# Patient Record
Sex: MALE | Race: WHITE | NOT HISPANIC OR LATINO | Employment: OTHER | URBAN - METROPOLITAN AREA
[De-identification: names, ages, dates, MRNs, and addresses within clinical notes are randomized per-mention and may not be internally consistent; named-entity substitution may affect disease eponyms.]

---

## 2020-12-28 ENCOUNTER — TRANSFERRED RECORDS (OUTPATIENT)
Dept: HEALTH INFORMATION MANAGEMENT | Facility: CLINIC | Age: 70
End: 2020-12-28
Payer: MEDICARE

## 2021-05-21 LAB
ALT SERPL-CCNC: 31 IU/L (ref 12–65)
AST SERPL-CCNC: 28 IU/L (ref 15–37)

## 2021-07-07 LAB
CHOLESTEROL (EXTERNAL): 101 MG/DL (ref 0–199)
HDLC SERPL-MCNC: 46 MG/DL (ref 40–59)
HEP C HIM: NORMAL
LDL CHOLESTEROL (EXTERNAL): 36 MG/DL
TRIGLYCERIDES (EXTERNAL): 97 MG/DL

## 2021-08-02 ENCOUNTER — TRANSFERRED RECORDS (OUTPATIENT)
Dept: HEALTH INFORMATION MANAGEMENT | Facility: CLINIC | Age: 71
End: 2021-08-02

## 2021-08-24 ENCOUNTER — TRANSFERRED RECORDS (OUTPATIENT)
Dept: HEALTH INFORMATION MANAGEMENT | Facility: CLINIC | Age: 71
End: 2021-08-24

## 2021-08-27 ENCOUNTER — TRANSFERRED RECORDS (OUTPATIENT)
Dept: HEALTH INFORMATION MANAGEMENT | Facility: CLINIC | Age: 71
End: 2021-08-27

## 2021-09-20 ENCOUNTER — TRANSFERRED RECORDS (OUTPATIENT)
Dept: HEALTH INFORMATION MANAGEMENT | Facility: CLINIC | Age: 71
End: 2021-09-20

## 2021-10-27 NOTE — PROGRESS NOTES
m  RADIATION ONCOLOGY CONSULTATION  [facility] PHYSICIANS    DATE:  October 27, 2021    PATIENT NAME: Efraín Crouch  MEDICAL RECORD NUMBER: [id number]    REFERRING PHYSICIAN:  Dr. Barry Perales, Urology ([facility])    REASON FOR CONSULTATION: Consideration of  definitive radiotherapy for management of adenocarcinoma of the prostate.    Staging:  Clinical  T1c N0 M0  Wren[place]'s score:   Wrenshall's score: 4+3=7  PSA:    8/2/21  11.870  (Diagnostic)  7/7/21  15.160  (Screen)  9/19/12 3.15      NCCN :    Unfavorable intermediate risk(>50% cores+, [place] 4+3, 2 or more Intermediate risks)  YANG RISK: Organ Confined(OC): 38.3%      Extra Prostattic Extension(EPE+): 46.5%      Seminal Vesicle Involvement(SV)+: 9.2%      Lymph Node Involvement (LN)+: 6%    KRISTIN RISK:  OC: 12      EPE+: 86     SV+: 33      LN+: 44      ADT: 10/14/21 22.5 mg Eligard at [facility]    HISTORY OF PRESENT ILLNESS: The patient is a 71 year old year old man who was found to have an elevated PSA with a prostate biopsy confirmation of adenocarcinoma of the prostate. He has been evaluated by Dr. Perales of Urology who has referred the patient here for consideration of definitive radiotherapy. He had an initial screening PSA on 7/7/21 that was elevated to 15.16. Repeat PSA returned on 8/2/21 as 11.87. He underwent transrectal ultrasound-guided prostate biopsy on 8/24/21 that demonstrated [place] 4+3=7 adenocarcinoma of the prostate in 11/12 cores.     Dr. Perales has initiated ADT (Eligard) on 10/14/21.     Mr. Crouch is here today with his wife. He has been on Flomax since January when requiring a catheter placement due to nephrolithiasis. His otherwise baseline nocturia is once nightly without any other urinary symptoms. He has noted some occasional hot flashes and possibly some fatigue from the Eligard, but has otherwise been tolerating it.     PMH:     Hyperlipidemia    Hypertension    Chronic kidney disease, stage  III    Retinal hemorrhage    Lumbar spinal stenosis    Right MCA ischemic stroke    Type 2 diabetes mellitus    Obstructive sleep apnea    PSH:    Open reduction internal fixation of the right wrist and elbow    Left eye surgery (vitreous hemorrhage)    Posterior laminectomy and decompression, lumbar spine    MEDICATIONS:    Albuterol HFA inhaler    Alirocumab    Amlodipine    Aspirin 81     Basaglar U-100 insulin    Carvedilol    Ciprofloxacin    Clopidogrel    Diphenoxylate-atropine    Doxepin    Famotidine    Fish oil    Furosemide    Hydralazine    Hydrocodone-acetaminophen    Insulin aspart U-100    Irbesartan    Lutein    Multivitamin    Nitroglycerin    Ondansetron    Potassium citrate    Tadalafil    Tamsulosin    Triamcinolone    Vitamin E    ALLERGY:    Doxycycline    Pencillins    Statins    Sulfa    FAMILY HISTORY:    Paternal grandfather with prostate cancer    SOCIAL HISTORY  - Former smoker, 1.5 packs per day x 15 years, quit 1/1/1977  - No alcohol use  - Resides in [place], [place]    ECOG PERFORMANCE STATUS: 1    HISTORY OF RADIATION: No  HISTORY OF IBD: No  IMPLANTED CARDIAC DEVICE: No     ROS: 10 point ROS reviewed as reported on the nursing assessment note.      PHYSICAL EXAMINATION:  VS: Vitals: There were no vitals taken for this visit.  BMI= There is no height or weight on file to calculate BMI.    ABDOMEN:  Soft and non tender without mass.    : Normal external genitalia. Normal sphincter tone. Palpable prostate nodule    PATHOLOGY:  Case: [id number]  Diagnosis A) PROSTATE, RIGHT, LATERAL APEX, NEEDLE BIOPSY:  1. Prostatic adenocarcinoma, Vladimir score 3 + 3 = 6 (ISUP Grade Group 1)  2. Total surface area involved: <5%  3. Number of needle biopsy cores involved: 1 of 1  4. Perineural invasion: Absent    B) PROSTATE, RIGHT, LATERAL MID, NEEDLE BIOPSY:  1. Prostatic adenocarcinoma, Vladimir score 3 + 4 = 7 (ISUP Grade Group 2), with Vladimir pattern 4 representing approximately 5% of tumor   2. Total  surface area involved: <5%  3. Number of needle biopsy cores involved: 1 of 1  4. Perineural invasion: Absent    C) PROSTATE, RIGHT, LATERAL BASE, NEEDLE BIOPSY:  1. Prostatic adenocarcinoma, [place] score 3 + 3 = 6 (ISUP Grade Group 1)  2. Total surface area involved: 10%  3. Number of needle biopsy cores involved: 1 of 1  4. Perineural invasion: Absent    D) PROSTATE, RIGHT, BASE, NEEDLE BIOPSY:  1. Prostatic adenocarcinoma, [place] score 3 + 3 = 6 (ISUP Grade Group 1)  2. Total surface area involved: 15%  3. Number of needle biopsy cores involved: 1 of 1  4. Perineural invasion: Absent    E) PROSTATE, RIGHT, MID, NEEDLE BIOPSY:  1. Prostatic adenocarcinoma, [place] score 4 + 3 = 7 (ISUP Grade Group 3), with [place] pattern 4 representing approximately 60% of tumor   2. Total surface area involved: 20%  3. Number of needle biopsy cores involved: 1 of 1  4. Perineural invasion: Absent    F) PROSTATE, RIGHT, APEX, NEEDLE BIOPSY:  1. Prostatic adenocarcinoma, [place] score 4 + 3 = 7 (ISUP Grade Group 3), with Vladimir pattern 4 representing approximately 70% of tumor  2. Total surface area involved: 5%  3. Number of needle biopsy cores involved: 1 of 1  4. Perineural invasion: Absent    G) PROSTATE, LEFT, LATERAL APEX, NEEDLE BIOPSY:  1. Prostatic adenocarcinoma, [place] score 4 + 3 = 7 (ISUP Grade Group 3), with [place] pattern 4 representing approximately 90% of tumor  2. Total surface area involved: 30%  3. Number of needle biopsy cores involved: 1 of 1  4. Perineural invasion: Absent    H) PROSTATE, LEFT, LATERAL MID, NEEDLE BIOPSY:  1. Prostatic adenocarcinoma, Vladimir score 3 + 4 = 7 (ISUP Grade Group 2), with Vladimir pattern 4 representing approximately 5% of tumor   2. Total surface area involved: 40%  3. Number of needle biopsy cores involved: 1 of 1  4. Perineural invasion: Absent    I) PROSTATE, LEFT, LATERAL BASE, NEEDLE BIOPSY:  1. Prostatic adenocarcinoma, Vladimir score 3 + 4 = 7 (ISUP Grade Group 2),  with Vladimir pattern 4 representing approximately 40% of tumor   2. Total surface area involved: 5%  3. Number of needle biopsy cores involved: 1 of 1  4. Perineural invasion: Absent    J) PROSTATE, LEFT, BASE, NEEDLE BIOPSY:  1. Prostatic tissue without evidence of neoplasm  2. No atypical foci, high grade prostatic intraepithelial neoplasia, or malignancy    K) PROSTATE, LEFT, MID, NEEDLE BIOPSY:  1. Prostatic adenocarcinoma, Vladimir score 4 + 3 = 7 (ISUP Grade Group 3), with [place] pattern 4 representing approximately 60% of tumor  2. Total surface area involved: 30%  3. Number of needle biopsy cores involved: 1 of 1  4. Perineural invasion: Absent    L) PROSTATE, LEFT, APEX, NEEDLE BIOPSY:  1. Prostatic adenocarcinoma, [place] score 3 + 4 = 7 (ISUP Grade Group 2), with [place] pattern 4 representing approximately 30% of tumor  2. Total surface area involved: 5%  3. Number of needle biopsy cores involved: 1 of 1  4. Perineural invasion: Absent   [facility] LABORATORY-CENTRAL LABORATORY Electronically signed by Bella Ramos MD for Cher Hernandez MD on 8/26/2021 at 11:57 AM   Comment A-I, K-L) The use of prostate cancer grade grouping has been shown to provide a simplified and more accurate patient outcome stratification and has been validated in a multi-institutional study of over 20,000 radical prostatectomy specimens, over 16,000 prostate biopsies, and over 5,000 prostate biopsies followed by radiation therapy (Gopi DAVENPORT, Nadia J. [2016] Diagn Pathol, PMID - [id number]). Grade grouping has been adopted and is recommended by the International Society of Urologic Pathologists, American Joint Commission on Cancer (AJCC) and Union for International Cancer Control (UICC).     Case seen in consultation with Dr. Ramos.            RADIOLOGY:    BONE SCAN: 9/23/21  FINDINGS:  No focal area of abnormally increased radiotracer activity is seen in the appendicular or axial skeleton to suggest bony  metastases.  There is a focal sclerotic density in the body of T11 on the CT of 08/27/2021. No abnormal radiotracer activity is seen at T11 on bone scan.  Focal areas of increased radiotracer activity are seen at the shoulders, elbows, wrists, hands, knees, feet, and ankles. Each of these is consistent with arthritic change.  There is increased activity over the anterior lower neck possibly free technetium in the thyroid.    IMPRESSION:  No evidence of bony metastases.      CT SCAN: 8/27/21  FINDINGS:  Lung bases:     There is no pleural or pericardial effusion. The heart size is normal. There is no acute airspace disease. There is no basilar pneumothorax.    Abdomen/pelvis:     There is no solid hepatic mass. No dilation of intrahepatic biliary radicals. No perihepatic ascites. Spleen size is normal. No adrenal mass. There is a benign right renal cyst. There is no solid renal mass. There is no perinephric fluid collection. There is no pancreatic mass or pancreatic duct dilation. There is no glandular atrophy. The IVC and main portal vein are patent. The splenic vein and SMV are patent.    The prostate and urinary bladder are normal. There is no evidence for a small bowel or colonic obstruction. There is no pneumatosis. There is no portal venous gas. There is no mucosal hyper enhancement. There is no inguinal or pelvic sidewall lymphadenopathy. Degenerative calcific plaque in a normal caliber abdominal aorta. The visceral artery branches are patent.    The bone windows demonstrate degenerative arthrosis at the symphysis pubis. Degenerative changes are present in the apophyseal joints. There is an indeterminate sclerotic lesion present in the lower thoracic spine. See image 22 of series 2. This is seen at the level of T11. This measures 7 mm in dimension.    IMPRESSION:  1. There is no pelvic sidewall or periprostatic lymphadenopathy.  2. No clear evidence on CT for extra prostatic extension. This can be further  assessed with MRI.  3. Sub centimeter sclerotic lesion in the lower thoracic spine. This is indeterminate for a blastic metastasis. Suggest correlation with the patient`s PSA levels, and skeletal scintigraphy.    Please note that all CT scans at this facility use dose modulation, iterative reconstruction, and/or weight-based dosing when appropriate to reduce radiation dose to as low as reasonably achievable.      IMPRESSION:  Unfavorable intermediate risk(>50% cores+, [place] 4+3, 2 or more Intermediate risks) adenocarcinoma of prostate. Sigifredo LN risk of 6%    RECOMMENDATION: For unfavorable intermediate risk adenocarcinoma of the prostate, his options would include surgery with radical prostatectomy or short course (6M) androgen deprivation therapy with radiation therapy.     Towards this, we recommend short course androgen deprivation therapy with hypofractionated radiation, 70 Gy in 28 fractions. He received 3M dose of Eligard on 10/14/21. We will get a planning MRI to evaluate local extent of disease and to determine candidacy for spaceOAR. We will have him follow-up with us after he gets his MRI to discuss next steps.     I reviewed my recommendations with the patient and answered all questions. I also discussed alternative therapies including possible surgery and medical management.     I explained the benefits of radiotherapy as well as related toxicities. I described the early and late toxicities associated with radiotherapy. The toxicities are itemized on the consent form for prostate radiotherapy that he signed today in clinic.    The patient was seen and examined with Dr. Madden.    Dulce Maria Christine MD PGY3  Department of Radiation Oncology  [phone] Clinic  [phone] Pager    I was present with the resident during the visit. I discussed the case with the resident and agree with the note as documented by the resident.    Aren Madden M.D.  Department of Radiation Oncology  [facility]  [place]

## 2021-10-28 ENCOUNTER — OFFICE VISIT (OUTPATIENT)
Dept: RADIATION THERAPY | Facility: OUTPATIENT CENTER | Age: 71
End: 2021-10-28
Payer: MEDICARE

## 2021-10-28 VITALS
SYSTOLIC BLOOD PRESSURE: 134 MMHG | WEIGHT: 230.6 LBS | RESPIRATION RATE: 18 BRPM | DIASTOLIC BLOOD PRESSURE: 58 MMHG | OXYGEN SATURATION: 98 % | HEART RATE: 58 BPM

## 2021-10-28 DIAGNOSIS — C61 PROSTATE CANCER (H): Primary | ICD-10-CM

## 2021-10-28 PROBLEM — I51.7 LEFT ATRIAL ENLARGEMENT: Status: ACTIVE | Noted: 2021-10-28

## 2021-10-28 PROBLEM — N17.9 AKI (ACUTE KIDNEY INJURY) (H): Status: ACTIVE | Noted: 2021-01-19

## 2021-10-28 PROBLEM — Z86.73 HX OF ISCHEMIC RIGHT MCA STROKE: Status: ACTIVE | Noted: 2021-10-28

## 2021-10-28 PROBLEM — I10 HTN (HYPERTENSION): Status: ACTIVE | Noted: 2020-09-25

## 2021-10-28 PROBLEM — N18.30 STAGE 3 CHRONIC KIDNEY DISEASE (H): Status: ACTIVE | Noted: 2020-09-25

## 2021-10-28 PROBLEM — R60.9 EDEMA: Status: ACTIVE | Noted: 2021-10-28

## 2021-10-28 PROBLEM — Z95.818 STATUS POST PLACEMENT OF IMPLANTABLE LOOP RECORDER: Status: ACTIVE | Noted: 2020-09-25

## 2021-10-28 RX ORDER — FOLIC ACID/MULTIVIT,IRON,MINER 0.4MG-18MG
1 TABLET ORAL DAILY
COMMUNITY

## 2021-10-28 RX ORDER — FAMOTIDINE 20 MG/1
20 TABLET, FILM COATED ORAL DAILY
COMMUNITY
Start: 2021-07-07

## 2021-10-28 RX ORDER — TAMSULOSIN HYDROCHLORIDE 0.4 MG/1
0.4 CAPSULE ORAL DAILY
COMMUNITY
Start: 2021-07-07 | End: 2024-10-02

## 2021-10-28 RX ORDER — HYDRALAZINE HYDROCHLORIDE 10 MG/1
40 TABLET, FILM COATED ORAL
COMMUNITY
Start: 2021-04-19

## 2021-10-28 RX ORDER — FUROSEMIDE 40 MG
1 TABLET ORAL SEE ADMIN INSTRUCTIONS
COMMUNITY
Start: 2021-07-07

## 2021-10-28 RX ORDER — ALIROCUMAB 150 MG/ML
150 INJECTION, SOLUTION SUBCUTANEOUS
COMMUNITY
Start: 2021-10-12

## 2021-10-28 RX ORDER — MULTIVITAMIN
1 TABLET ORAL DAILY
COMMUNITY

## 2021-10-28 RX ORDER — CLOPIDOGREL BISULFATE 75 MG/1
1 TABLET ORAL EVERY MORNING
COMMUNITY
Start: 2021-07-07

## 2021-10-28 RX ORDER — LUTEIN 10 MG
10 TABLET ORAL DAILY
COMMUNITY

## 2021-10-28 RX ORDER — AMLODIPINE BESYLATE 10 MG/1
10 TABLET ORAL DAILY
COMMUNITY
Start: 2021-07-07

## 2021-10-28 RX ORDER — ALBUTEROL SULFATE 90 UG/1
2 AEROSOL, METERED RESPIRATORY (INHALATION) EVERY 4 HOURS PRN
COMMUNITY
Start: 2021-07-07

## 2021-10-28 RX ORDER — IRBESARTAN 300 MG/1
0.5 TABLET ORAL 2 TIMES DAILY
COMMUNITY
Start: 2021-07-07

## 2021-10-28 RX ORDER — FUROSEMIDE 20 MG
1 TABLET ORAL SEE ADMIN INSTRUCTIONS
COMMUNITY
Start: 2021-07-07

## 2021-10-28 RX ORDER — CARVEDILOL 25 MG/1
25 TABLET ORAL 2 TIMES DAILY
COMMUNITY
Start: 2021-07-07

## 2021-10-28 RX ORDER — NITROGLYCERIN 0.4 MG/1
1 TABLET SUBLINGUAL
COMMUNITY
Start: 2021-07-07

## 2021-10-28 RX ORDER — DOXEPIN 3 MG/1
3 TABLET, FILM COATED ORAL AT BEDTIME
COMMUNITY
Start: 2021-08-31

## 2021-10-28 RX ORDER — INSULIN GLARGINE 100 [IU]/ML
15 INJECTION, SOLUTION SUBCUTANEOUS 2 TIMES DAILY
COMMUNITY
Start: 2021-07-07

## 2021-10-28 NOTE — LETTER
10/28/2021         RE: Efrían Crouch  [address] [place] [id number]        Dear Colleague,    Thank you for referring your patient, Efraín Crouch, to the [facility]. Please see a copy of my visit note below.    m  RADIATION ONCOLOGY CONSULTATION  [facility] PHYSICIANS    DATE:  October 27, 2021    PATIENT NAME: Efraín Crouch  MEDICAL RECORD NUMBER: [id number]    REFERRING PHYSICIAN:  Dr. Barry Perales, Urology ([facility])    REASON FOR CONSULTATION: Consideration of  definitive radiotherapy for management of adenocarcinoma of the prostate.    Staging:  Clinical  T1c N0 M0  [place]'s score:   [place]'s score: 4+3=7  PSA:    8/2/21  11.870  (Diagnostic)  7/7/21  15.160  (Screen)  9/19/12 3.15      NCCN :    Unfavorable intermediate risk(>50% cores+, Vladimir 4+3, 2 or more Intermediate risks)  YANG RISK: Organ Confined(OC): 38.3%      Extra Prostattic Extension(EPE+): 46.5%      Seminal Vesicle Involvement(SV)+: 9.2%      Lymph Node Involvement (LN)+: 6%    KRISTIN RISK:  OC: 12      EPE+: 86     SV+: 33      LN+: 44      ADT: 10/14/21 22.5 mg Eligard at [facility]    HISTORY OF PRESENT ILLNESS: The patient is a 71 year old year old man who was found to have an elevated PSA with a prostate biopsy confirmation of adenocarcinoma of the prostate. He has been evaluated by Dr. Perales of Urology who has referred the patient here for consideration of definitive radiotherapy. He had an initial screening PSA on 7/7/21 that was elevated to 15.16. Repeat PSA returned on 8/2/21 as 11.87. He underwent transrectal ultrasound-guided prostate biopsy on 8/24/21 that demonstrated Vladimir 4+3=7 adenocarcinoma of the prostate in 11/12 cores.     Dr. Perales has initiated ADT (Eligard) on 10/14/21.     Mr. Crouch is here today with his wife. He has been on Flomax since January when requiring a catheter placement due to nephrolithiasis. His otherwise baseline nocturia is once nightly  without any other urinary symptoms. He has noted some occasional hot flashes and possibly some fatigue from the Eligard, but has otherwise been tolerating it.     PMH:     Hyperlipidemia    Hypertension    Chronic kidney disease, stage III    Retinal hemorrhage    Lumbar spinal stenosis    Right MCA ischemic stroke    Type 2 diabetes mellitus    Obstructive sleep apnea    PSH:    Open reduction internal fixation of the right wrist and elbow    Left eye surgery (vitreous hemorrhage)    Posterior laminectomy and decompression, lumbar spine    MEDICATIONS:    Albuterol HFA inhaler    Alirocumab    Amlodipine    Aspirin 81     Basaglar U-100 insulin    Carvedilol    Ciprofloxacin    Clopidogrel    Diphenoxylate-atropine    Doxepin    Famotidine    Fish oil    Furosemide    Hydralazine    Hydrocodone-acetaminophen    Insulin aspart U-100    Irbesartan    Lutein    Multivitamin    Nitroglycerin    Ondansetron    Potassium citrate    Tadalafil    Tamsulosin    Triamcinolone    Vitamin E    ALLERGY:    Doxycycline    Pencillins    Statins    Sulfa    FAMILY HISTORY:    Paternal grandfather with prostate cancer    SOCIAL HISTORY  - Former smoker, 1.5 packs per day x 15 years, quit 1/1/1977  - No alcohol use  - Resides in [place], [place]    ECOG PERFORMANCE STATUS: 1    HISTORY OF RADIATION: No  HISTORY OF IBD: No  IMPLANTED CARDIAC DEVICE: No     ROS: 10 point ROS reviewed as reported on the nursing assessment note.      PHYSICAL EXAMINATION:  VS: Vitals: There were no vitals taken for this visit.  BMI= There is no height or weight on file to calculate BMI.    ABDOMEN:  Soft and non tender without mass.    : Normal external genitalia. Normal sphincter tone. Palpable prostate nodule    PATHOLOGY:  Case: [id number]  Diagnosis A) PROSTATE, RIGHT, LATERAL APEX, NEEDLE BIOPSY:  1. Prostatic adenocarcinoma, Vladimir score 3 + 3 = 6 (ISUP Grade Group 1)  2. Total surface area involved: <5%  3. Number of needle biopsy cores involved: 1 of  1  4. Perineural invasion: Absent    B) PROSTATE, RIGHT, LATERAL MID, NEEDLE BIOPSY:  1. Prostatic adenocarcinoma, Vladimir score 3 + 4 = 7 (ISUP Grade Group 2), with [place] pattern 4 representing approximately 5% of tumor   2. Total surface area involved: <5%  3. Number of needle biopsy cores involved: 1 of 1  4. Perineural invasion: Absent    C) PROSTATE, RIGHT, LATERAL BASE, NEEDLE BIOPSY:  1. Prostatic adenocarcinoma, [place] score 3 + 3 = 6 (ISUP Grade Group 1)  2. Total surface area involved: 10%  3. Number of needle biopsy cores involved: 1 of 1  4. Perineural invasion: Absent    D) PROSTATE, RIGHT, BASE, NEEDLE BIOPSY:  1. Prostatic adenocarcinoma, [place] score 3 + 3 = 6 (ISUP Grade Group 1)  2. Total surface area involved: 15%  3. Number of needle biopsy cores involved: 1 of 1  4. Perineural invasion: Absent    E) PROSTATE, RIGHT, MID, NEEDLE BIOPSY:  1. Prostatic adenocarcinoma, Vladimir score 4 + 3 = 7 (ISUP Grade Group 3), with [place] pattern 4 representing approximately 60% of tumor   2. Total surface area involved: 20%  3. Number of needle biopsy cores involved: 1 of 1  4. Perineural invasion: Absent    F) PROSTATE, RIGHT, APEX, NEEDLE BIOPSY:  1. Prostatic adenocarcinoma, Vladimir score 4 + 3 = 7 (ISUP Grade Group 3), with Vladimir pattern 4 representing approximately 70% of tumor  2. Total surface area involved: 5%  3. Number of needle biopsy cores involved: 1 of 1  4. Perineural invasion: Absent    G) PROSTATE, LEFT, LATERAL APEX, NEEDLE BIOPSY:  1. Prostatic adenocarcinoma, [place] score 4 + 3 = 7 (ISUP Grade Group 3), with Vladimir pattern 4 representing approximately 90% of tumor  2. Total surface area involved: 30%  3. Number of needle biopsy cores involved: 1 of 1  4. Perineural invasion: Absent    H) PROSTATE, LEFT, LATERAL MID, NEEDLE BIOPSY:  1. Prostatic adenocarcinoma, [place] score 3 + 4 = 7 (ISUP Grade Group 2), with [place] pattern 4 representing approximately 5% of tumor   2. Total  surface area involved: 40%  3. Number of needle biopsy cores involved: 1 of 1  4. Perineural invasion: Absent    I) PROSTATE, LEFT, LATERAL BASE, NEEDLE BIOPSY:  1. Prostatic adenocarcinoma, [place] score 3 + 4 = 7 (ISUP Grade Group 2), with [place] pattern 4 representing approximately 40% of tumor   2. Total surface area involved: 5%  3. Number of needle biopsy cores involved: 1 of 1  4. Perineural invasion: Absent    J) PROSTATE, LEFT, BASE, NEEDLE BIOPSY:  1. Prostatic tissue without evidence of neoplasm  2. No atypical foci, high grade prostatic intraepithelial neoplasia, or malignancy    K) PROSTATE, LEFT, MID, NEEDLE BIOPSY:  1. Prostatic adenocarcinoma, [place] score 4 + 3 = 7 (ISUP Grade Group 3), with [place] pattern 4 representing approximately 60% of tumor  2. Total surface area involved: 30%  3. Number of needle biopsy cores involved: 1 of 1  4. Perineural invasion: Absent    L) PROSTATE, LEFT, APEX, NEEDLE BIOPSY:  1. Prostatic adenocarcinoma, Vladimir score 3 + 4 = 7 (ISUP Grade Group 2), with Vladimir pattern 4 representing approximately 30% of tumor  2. Total surface area involved: 5%  3. Number of needle biopsy cores involved: 1 of 1  4. Perineural invasion: Absent   [facility] LABORATORY-CENTRAL LABORATORY Electronically signed by Bella Ramos MD for Cher Hernandez MD on 8/26/2021 at 11:57 AM   Comment A-I, K-L) The use of prostate cancer grade grouping has been shown to provide a simplified and more accurate patient outcome stratification and has been validated in a multi-institutional study of over 20,000 radical prostatectomy specimens, over 16,000 prostate biopsies, and over 5,000 prostate biopsies followed by radiation therapy (Gopi DAVENPORT, Nadia J. [2016] Diagn Pathol, PMID - [id number]). Grade grouping has been adopted and is recommended by the International Society of Urologic Pathologists, American Joint Commission on Cancer (AJCC) and Union for International Cancer Control  (UICC).     Case seen in consultation with Dr. Ramos.            RADIOLOGY:    BONE SCAN: 9/23/21  FINDINGS:  No focal area of abnormally increased radiotracer activity is seen in the appendicular or axial skeleton to suggest bony metastases.  There is a focal sclerotic density in the body of T11 on the CT of 08/27/2021. No abnormal radiotracer activity is seen at T11 on bone scan.  Focal areas of increased radiotracer activity are seen at the shoulders, elbows, wrists, hands, knees, feet, and ankles. Each of these is consistent with arthritic change.  There is increased activity over the anterior lower neck possibly free technetium in the thyroid.    IMPRESSION:  No evidence of bony metastases.      CT SCAN: 8/27/21  FINDINGS:  Lung bases:     There is no pleural or pericardial effusion. The heart size is normal. There is no acute airspace disease. There is no basilar pneumothorax.    Abdomen/pelvis:     There is no solid hepatic mass. No dilation of intrahepatic biliary radicals. No perihepatic ascites. Spleen size is normal. No adrenal mass. There is a benign right renal cyst. There is no solid renal mass. There is no perinephric fluid collection. There is no pancreatic mass or pancreatic duct dilation. There is no glandular atrophy. The IVC and main portal vein are patent. The splenic vein and SMV are patent.    The prostate and urinary bladder are normal. There is no evidence for a small bowel or colonic obstruction. There is no pneumatosis. There is no portal venous gas. There is no mucosal hyper enhancement. There is no inguinal or pelvic sidewall lymphadenopathy. Degenerative calcific plaque in a normal caliber abdominal aorta. The visceral artery branches are patent.    The bone windows demonstrate degenerative arthrosis at the symphysis pubis. Degenerative changes are present in the apophyseal joints. There is an indeterminate sclerotic lesion present in the lower thoracic spine. See image 22 of series 2.  This is seen at the level of T11. This measures 7 mm in dimension.    IMPRESSION:  1. There is no pelvic sidewall or periprostatic lymphadenopathy.  2. No clear evidence on CT for extra prostatic extension. This can be further assessed with MRI.  3. Sub centimeter sclerotic lesion in the lower thoracic spine. This is indeterminate for a blastic metastasis. Suggest correlation with the patient`s PSA levels, and skeletal scintigraphy.    Please note that all CT scans at this facility use dose modulation, iterative reconstruction, and/or weight-based dosing when appropriate to reduce radiation dose to as low as reasonably achievable.      IMPRESSION:  Unfavorable intermediate risk(>50% cores+, Vladimir 4+3, 2 or more Intermediate risks) adenocarcinoma of prostate. Sigifredo LN risk of 6%    RECOMMENDATION: For unfavorable intermediate risk adenocarcinoma of the prostate, his options would include surgery with radical prostatectomy or short course (6M) androgen deprivation therapy with radiation therapy.     Towards this, we recommend short course androgen deprivation therapy with hypofractionated radiation, 70 Gy in 28 fractions. He received 3M dose of Eligard on 10/14/21. We will get a planning MRI to evaluate local extent of disease and to determine candidacy for spaceOAR. We will have him follow-up with us after he gets his MRI to discuss next steps.     I reviewed my recommendations with the patient and answered all questions. I also discussed alternative therapies including possible surgery and medical management.     I explained the benefits of radiotherapy as well as related toxicities. I described the early and late toxicities associated with radiotherapy. The toxicities are itemized on the consent form for prostate radiotherapy that he signed today in clinic.    The patient was seen and examined with Dr. Madden.    Dulce Maria Christine MD PGY3  Department of Radiation Oncology  [phone] [facility]  [phone]  Pager    I was present with the resident during the visit. I discussed the case with the resident and agree with the note as documented by the resident.    Aren Madden M.D.  Department of Radiation Oncology  [facility]            Again, thank you for allowing me to participate in the care of your patient.        Sincerely,        Aren Madden MD

## 2021-10-28 NOTE — LETTER
Date:November 1, 2021      Patient was self referred, no letter generated. Do not send.        [facility] Health Information

## 2021-10-28 NOTE — NURSING NOTE
"REASON FOR APPOINTMENT   Type of Cancer: prostate cancer 4+3=7  Location:   Date of Symptom Onset: bladder change, rising PSA    TREATMENT TO-DATE FOR THIS CANCER  Surgery ? Pt heard about surgery, declined, wants to pursue radiation   Chemotherapy ? Started Eligard 22.5 mg 10/14/21, prescribed and started withDR Qualey - urology   Other Treatments for this Cancer ? Discussion today for radiation    PERSONAL HISTORY OF CANCER   Previous Cancer ? no   Prior Radiation ? no   Prior Chemotherapy ? no   Prior Hormonal Therapy ? no     RECENT IMAGING STUDIES  CT scan (date: 8/27/2, location: [facility])  bone scan (date: 9/21/21, location: [facility])    REFERRALS NEEDED  None at this time    VITALS  /58   Pulse 58   Resp 18   Wt 104.6 kg (230 lb 9.6 oz)   SpO2 98%     PACEMAKER/IMPLANTED CARDIAC DEVICE Loop recorder - will work with cardiology    PAIN  Denies pelvic, prostate pain.  Chronic back pain/stenosis    PSYCHOSOCIAL  Marital Status:   Patient lives in [place] with wife, Natalie.  Number of children:   Working status: retired from [employer]  Do you feel safe in your home? Yes    REVIEW OF SYSTEMS  Skin: negative  Eyes: glasses  Ears/Nose/Throat: negative  Respiratory: No shortness of breath, dyspnea on exertion, cough, or hemoptysis  Cardiovascular: loop recorder. Hx of tia /storke 2014 - fully recovered  Gastrointestinal: negative  Genitourinary: frequency, CKD  Musculoskeletal: back pain  Neurologic: negative  Psychiatric: negative  Hematologic/Lymphatic/Immunologic: negative  Endocrine: diabetes, insulin dependant      Radiation Oncology Patient Teaching    Current Concern: \" I have decided against surgery due to possible SE and outcomes. I would like to pursue radiation to treat prostate cancer\"    Person involved with teaching: Patient and Wife  Patient asked Questions: Yes  Patient was cooperative: Yes  Patient was receptive (willing to accept information given): Yes    Education " Assessment  Comprehension ability: Medium  Knowledge level: Medium  Factors affecting teaching: None    Education Materials Given  Radiation Therapy and You  Radiation to the pelvis  Nutrition/diet change    Educational Topics Discussed  Side effects, Medications, Activity, Nutrition, Adjustment to illness and When to call MD/RN    Response To Teaching  More review necessary      Do you have an advanced directive or living will? yes  Are you DNR/DNI? no

## 2021-11-16 ENCOUNTER — TRANSFERRED RECORDS (OUTPATIENT)
Dept: HEALTH INFORMATION MANAGEMENT | Facility: CLINIC | Age: 71
End: 2021-11-16
Payer: MEDICARE

## 2021-11-22 ENCOUNTER — OFFICE VISIT (OUTPATIENT)
Dept: RADIATION THERAPY | Facility: OUTPATIENT CENTER | Age: 71
End: 2021-11-22
Payer: MEDICARE

## 2021-11-22 VITALS
DIASTOLIC BLOOD PRESSURE: 60 MMHG | SYSTOLIC BLOOD PRESSURE: 122 MMHG | OXYGEN SATURATION: 97 % | TEMPERATURE: 97.7 F | WEIGHT: 230 LBS | RESPIRATION RATE: 16 BRPM | HEART RATE: 59 BPM

## 2021-11-22 DIAGNOSIS — C61 PROSTATE CANCER (H): Primary | ICD-10-CM

## 2021-11-22 PROBLEM — N20.0 NEPHROLITHIASIS: Status: ACTIVE | Noted: 2019-12-09

## 2021-11-22 PROBLEM — R97.20 PSA ELEVATION: Status: ACTIVE | Noted: 2021-11-22

## 2021-11-22 PROBLEM — D72.829 LEUKOCYTOSIS: Status: ACTIVE | Noted: 2021-01-19

## 2021-11-22 PROBLEM — N13.30 HYDRONEPHROSIS OF RIGHT KIDNEY: Status: ACTIVE | Noted: 2021-01-19

## 2021-11-22 PROBLEM — N20.0 URINARY TRACT OBSTRUCTION DUE TO KIDNEY STONE: Status: ACTIVE | Noted: 2021-01-19

## 2021-11-22 PROBLEM — N13.8 URINARY TRACT OBSTRUCTION DUE TO KIDNEY STONE: Status: ACTIVE | Noted: 2021-01-19

## 2021-11-22 RX ORDER — PEN NEEDLE, DIABETIC 29 G X1/2"
NEEDLE, DISPOSABLE MISCELLANEOUS
COMMUNITY
Start: 2021-07-07

## 2021-11-22 RX ORDER — MULTIVIT WITH MINERALS/LUTEIN
1000 TABLET ORAL PRN
COMMUNITY

## 2021-11-22 RX ORDER — DIPHENOXYLATE HCL/ATROPINE 2.5-.025MG
TABLET ORAL PRN
COMMUNITY
Start: 2021-07-07

## 2021-11-22 RX ORDER — POTASSIUM CITRATE 10 MEQ/1
1 TABLET, EXTENDED RELEASE ORAL
COMMUNITY
Start: 2021-06-08

## 2021-11-22 RX ORDER — LANCETS
EACH MISCELLANEOUS
COMMUNITY
Start: 2021-07-07

## 2021-11-22 RX ORDER — TADALAFIL 20 MG/1
TABLET ORAL PRN
COMMUNITY
Start: 2021-07-07

## 2021-11-22 RX ORDER — HYDROCODONE BITARTRATE AND ACETAMINOPHEN 5; 325 MG/1; MG/1
1-2 TABLET ORAL PRN
COMMUNITY
Start: 2021-08-26

## 2021-11-22 RX ORDER — TRIAMCINOLONE ACETONIDE 1 MG/G
CREAM TOPICAL
COMMUNITY

## 2021-11-22 RX ORDER — L. ACIDOPHILUS/BIFIDO. LONGUM 15 MG
CAPSULE,DELAYED RELEASE (ENTERIC COATED) ORAL
COMMUNITY
Start: 2021-07-07

## 2021-11-22 ASSESSMENT — PAIN SCALES - GENERAL: PAINLEVEL: MILD PAIN (2)

## 2021-11-22 NOTE — NURSING NOTE
Oncology Rooming Note    November 22, 2021 12:49 PM   Efraín Crouch is a 71 year old male who presents for:    Chief Complaint   Patient presents with     Radiation Therapy     Return appointment with Dr. Madden     Initial Vitals: /60 (BP Location: Left arm, Patient Position: Sitting, Cuff Size: Adult Regular)   Pulse 59   Temp 97.7  F (36.5  C) (Oral)   Resp 16   Wt 104.3 kg (230 lb)   SpO2 97%  There is no height or weight on file to calculate BMI. There is no height or weight on file to calculate BSA.  Mild Pain (2) Comment: Data Unavailable   No LMP for male patient.  Allergies reviewed: Yes  Medications reviewed: Yes    Medications: Medication refills not needed today.  Pharmacy name entered into EPIC: Data Unavailable    Clinical concerns: Return appointment to review MRI results. Dr. Madden was notified.    Plan for referral to Dr. Garcia for space OAR, Fiducial marker placement. Patient will then return mid December for CT/SIM and MRI.    Jamaica Causey RN

## 2021-11-22 NOTE — PROGRESS NOTES
Radiation Oncology Note    HPI: 71M with a diagnosis of prostate cancer, unfavorable intermediate risk, Vladimir score 4+3=7, PSA = 15.1, cT1cN0.     At consultation, we discussed definitive RT + ST-ADT.     Patient has been started on Eligard (3 month) on 10/14/21.    We ordered interval MRI to evaluate for local extent of disease. MRI on 11/16/21 was negative for MARCELO/SVI. No enlarged pelvic nodes.     Plan:  1. Proceed with plan for RT. Plan for 28 fractions. Will plan to start RT 2-3 months from ADT start. Tentative CT simulation time for radiation planning in mid December 2021.    2. Will reach out to urology colleagues (Dr. Garcia) to place prostate fiducials and Space OAR gel.     3. Patient is in agreement with the current plan in place.       Aren Madden M.D.  Department of Radiation Oncology  [facility]

## 2021-11-22 NOTE — LETTER
11/22/2021         RE: Efraín Crouch  [address] [address]        Dear Colleague,    Thank you for referring your patient, Efraín Crouch, to the [facility]. Please see a copy of my visit note below.    Radiation Oncology Note    HPI: 71M with a diagnosis of prostate cancer, unfavorable intermediate risk, [place] score 4+3=7, PSA = 15.1, cT1cN0.     At consultation, we discussed definitive RT + ST-ADT.     Patient has been started on Eligard (3 month) on 10/14/21.    We ordered interval MRI to evaluate for local extent of disease. MRI on 11/16/21 was negative for MARCELO/SVI. No enlarged pelvic nodes.     Plan:  1. Proceed with plan for RT. Plan for 28 fractions. Will plan to start RT 2-3 months from ADT start. Tentative CT simulation time for radiation planning in mid December 2021.    2. Will reach out to urology colleagues (Dr. Garcia) to place prostate fiducials and Space OAR gel.     3. Patient is in agreement with the current plan in place.       Aren Madden M.D.  Department of Radiation Oncology  [facility]

## 2021-11-30 ENCOUNTER — TELEPHONE (OUTPATIENT)
Dept: UROLOGY | Facility: CLINIC | Age: 71
End: 2021-11-30
Payer: MEDICARE

## 2021-11-30 NOTE — TELEPHONE ENCOUNTER
Left message for patient to call [phone]  Virtual appointment scheduled 12/3 in the am to discuss fiducials and space OAR.  Cher Messina, CMA

## 2021-11-30 NOTE — TELEPHONE ENCOUNTER
----- Message from Ida Handley RN sent at 11/29/2021  2:30 PM CST -----  Hello,    Reaching out about another patient with prostate cancer. Dr. Madden would like to refer this patient to Dr. Garcia to discuss and place SpaceOAR/fiducial prior to radiation.    We plan to see him back mid Dec for CT/SIM and repeat MRI. Could you please have your office contact the patient to schedule. Please let me know if you have any questions!     Thank you very much for all you do!    Have a good day    HALEY Prieto  [facility]  At [facility]  [phone]

## 2021-12-03 ENCOUNTER — PREP FOR PROCEDURE (OUTPATIENT)
Dept: SURGERY | Facility: CLINIC | Age: 71
End: 2021-12-03

## 2021-12-03 ENCOUNTER — VIRTUAL VISIT (OUTPATIENT)
Dept: UROLOGY | Facility: CLINIC | Age: 71
End: 2021-12-03
Payer: MEDICARE

## 2021-12-03 ENCOUNTER — PREP FOR PROCEDURE (OUTPATIENT)
Dept: UROLOGY | Facility: CLINIC | Age: 71
End: 2021-12-03

## 2021-12-03 DIAGNOSIS — C61 PROSTATE CANCER (H): Primary | ICD-10-CM

## 2021-12-03 DIAGNOSIS — Z11.59 ENCOUNTER FOR SCREENING FOR OTHER VIRAL DISEASES: ICD-10-CM

## 2021-12-03 PROCEDURE — 99203 OFFICE O/P NEW LOW 30 MIN: CPT | Mod: 95 | Performed by: UROLOGY

## 2021-12-03 NOTE — PROGRESS NOTES
Efraín is a 71 year old who is being evaluated via a billable video visit.      How would you like to obtain your AVS? MyChart  If the video visit is dropped, the invitation should be resent by: Text to cell phone:    Will anyone else be joining your video visit? No      Video Start Time: 920    Assessment & Plan   Problem List Items Addressed This Visit     None      Visit Diagnoses     Prostate cancer (H)    -  Primary           Review of the result(s) of each unique test - path check  Ordering of each unique test  30 minutes spent on the date of the encounter doing chart review, history and exam, documentation and further activities per the note       See Patient Instructions    No follow-ups on file.    Ruddy Garcia MD  [facility] CARLEEN Kenny is a 71 year old who presents for the following health issues prostate cancer    HPI     Patient is a pleasant 71-year-old gentleman with recent history of prostate cancer.  He is planning to proceed with radiation treatment.  He is here to discuss SpaceOAR and fiducial marker placement.    Review of Systems   Constitutional, HEENT, cardiovascular, pulmonary, gi and gu systems are negative, except as otherwise noted.      Objective           Vitals:  No vitals were obtained today due to virtual visit.    Physical Exam   GENERAL: Healthy, alert and no distress  EYES: Eyes grossly normal to inspection.  No discharge or erythema, or obvious scleral/conjunctival abnormalities.  RESP: No audible wheeze, cough, or visible cyanosis.  No visible retractions or increased work of breathing.    SKIN: Visible skin clear. No significant rash, abnormal pigmentation or lesions.  NEURO: Cranial nerves grossly intact.  Mentation and speech appropriate for age.  PSYCH: Mentation appears normal, affect normal/bright, judgement and insight intact, normal speech and appearance well-groomed.    Prostate cancer: Schedule SpaceOAR and fiducial marker placement.   Risks and benefits discussed.            Video-Visit Details    Type of service:  Video Visit    Video End Time:9:32 AM    Originating Location (pt. Location): Home    Distant Location (provider location):  [facility]     Platform used for Video Visit: Lakisha

## 2021-12-05 DIAGNOSIS — C61 PROSTATE CANCER (H): Primary | ICD-10-CM

## 2021-12-17 ENCOUNTER — TRANSFERRED RECORDS (OUTPATIENT)
Dept: MULTI SPECIALTY CLINIC | Facility: CLINIC | Age: 71
End: 2021-12-17
Payer: MEDICARE

## 2021-12-17 LAB
CREATININE (EXTERNAL): 1.5 MG/DL (ref 0.6–1.3)
GFR ESTIMATED (EXTERNAL): 46 ML/MIN/1.73M2
GFR ESTIMATED (IF AFRICAN AMERICAN) (EXTERNAL): 56 ML/MIN/1.73M2
GLUCOSE (EXTERNAL): 253 MG/DL (ref 70–100)
HBA1C MFR BLD: 9.1 %
POTASSIUM (EXTERNAL): 4.9 MMOL/L (ref 3.5–5.1)

## 2021-12-19 ENCOUNTER — LAB (OUTPATIENT)
Dept: LAB | Facility: CLINIC | Age: 71
End: 2021-12-19
Payer: MEDICARE

## 2021-12-19 DIAGNOSIS — Z11.59 ENCOUNTER FOR SCREENING FOR OTHER VIRAL DISEASES: ICD-10-CM

## 2021-12-19 PROCEDURE — U0005 INFEC AGEN DETEC AMPLI PROBE: HCPCS

## 2021-12-19 PROCEDURE — U0003 INFECTIOUS AGENT DETECTION BY NUCLEIC ACID (DNA OR RNA); SEVERE ACUTE RESPIRATORY SYNDROME CORONAVIRUS 2 (SARS-COV-2) (CORONAVIRUS DISEASE [COVID-19]), AMPLIFIED PROBE TECHNIQUE, MAKING USE OF HIGH THROUGHPUT TECHNOLOGIES AS DESCRIBED BY CMS-2020-01-R: HCPCS

## 2021-12-20 LAB — SARS-COV-2 RNA RESP QL NAA+PROBE: NEGATIVE

## 2021-12-22 ENCOUNTER — ANESTHESIA EVENT (OUTPATIENT)
Dept: SURGERY | Facility: CLINIC | Age: 71
End: 2021-12-22
Payer: MEDICARE

## 2021-12-22 ENCOUNTER — ANESTHESIA (OUTPATIENT)
Dept: SURGERY | Facility: CLINIC | Age: 71
End: 2021-12-22
Payer: MEDICARE

## 2021-12-22 ENCOUNTER — HOSPITAL ENCOUNTER (OUTPATIENT)
Facility: CLINIC | Age: 71
Discharge: HOME | End: 2021-12-22
Attending: UROLOGY | Admitting: UROLOGY
Payer: MEDICARE

## 2021-12-22 VITALS
SYSTOLIC BLOOD PRESSURE: 124 MMHG | OXYGEN SATURATION: 93 % | DIASTOLIC BLOOD PRESSURE: 60 MMHG | HEART RATE: 55 BPM | TEMPERATURE: 97.6 F | RESPIRATION RATE: 16 BRPM

## 2021-12-22 DIAGNOSIS — C61 PROSTATE CANCER (H): Primary | ICD-10-CM

## 2021-12-22 LAB
GLUCOSE BLDC GLUCOMTR-MCNC: 145 MG/DL (ref 70–99)
GLUCOSE BLDC GLUCOMTR-MCNC: 79 MG/DL (ref 70–99)

## 2021-12-22 PROCEDURE — 360N000075 HC SURGERY LEVEL 2, PER MIN: Performed by: UROLOGY

## 2021-12-22 PROCEDURE — 250N000009 HC RX 250: Performed by: NURSE ANESTHETIST, CERTIFIED REGISTERED

## 2021-12-22 PROCEDURE — 370N000017 HC ANESTHESIA TECHNICAL FEE, PER MIN: Performed by: UROLOGY

## 2021-12-22 PROCEDURE — 710N000012 HC RECOVERY PHASE 2, PER MINUTE: Performed by: UROLOGY

## 2021-12-22 PROCEDURE — A4648 IMPLANTABLE TISSUE MARKER: HCPCS | Performed by: UROLOGY

## 2021-12-22 PROCEDURE — 999N000141 HC STATISTIC PRE-PROCEDURE NURSING ASSESSMENT: Performed by: UROLOGY

## 2021-12-22 PROCEDURE — 250N000011 HC RX IP 250 OP 636: Performed by: UROLOGY

## 2021-12-22 PROCEDURE — 250N000009 HC RX 250: Performed by: UROLOGY

## 2021-12-22 PROCEDURE — 250N000011 HC RX IP 250 OP 636: Performed by: NURSE ANESTHETIST, CERTIFIED REGISTERED

## 2021-12-22 PROCEDURE — 82962 GLUCOSE BLOOD TEST: CPT | Mod: 91

## 2021-12-22 PROCEDURE — 272N000001 HC OR GENERAL SUPPLY STERILE: Performed by: UROLOGY

## 2021-12-22 PROCEDURE — 258N000003 HC RX IP 258 OP 636: Performed by: NURSE ANESTHETIST, CERTIFIED REGISTERED

## 2021-12-22 PROCEDURE — 55876 PLACE RT DEVICE/MARKER PROS: CPT | Performed by: UROLOGY

## 2021-12-22 RX ORDER — SODIUM CHLORIDE, SODIUM LACTATE, POTASSIUM CHLORIDE, CALCIUM CHLORIDE 600; 310; 30; 20 MG/100ML; MG/100ML; MG/100ML; MG/100ML
INJECTION, SOLUTION INTRAVENOUS CONTINUOUS
Status: DISCONTINUED | OUTPATIENT
Start: 2021-12-22 | End: 2021-12-22 | Stop reason: HOSPADM

## 2021-12-22 RX ORDER — CEFAZOLIN SODIUM 2 G/100ML
2 INJECTION, SOLUTION INTRAVENOUS
Status: COMPLETED | OUTPATIENT
Start: 2021-12-22 | End: 2021-12-22

## 2021-12-22 RX ORDER — PROPOFOL 10 MG/ML
INJECTION, EMULSION INTRAVENOUS CONTINUOUS PRN
Status: DISCONTINUED | OUTPATIENT
Start: 2021-12-22 | End: 2021-12-22

## 2021-12-22 RX ORDER — HYDROCODONE BITARTRATE AND ACETAMINOPHEN 5; 325 MG/1; MG/1
1-2 TABLET ORAL EVERY 4 HOURS PRN
Qty: 10 TABLET | Refills: 0 | Status: SHIPPED | OUTPATIENT
Start: 2021-12-22

## 2021-12-22 RX ORDER — CEFAZOLIN SODIUM 2 G/100ML
2 INJECTION, SOLUTION INTRAVENOUS SEE ADMIN INSTRUCTIONS
Status: DISCONTINUED | OUTPATIENT
Start: 2021-12-22 | End: 2021-12-22 | Stop reason: HOSPADM

## 2021-12-22 RX ORDER — BUPIVACAINE HYDROCHLORIDE 5 MG/ML
INJECTION, SOLUTION PERINEURAL PRN
Status: DISCONTINUED | OUTPATIENT
Start: 2021-12-22 | End: 2021-12-22 | Stop reason: HOSPADM

## 2021-12-22 RX ORDER — EPHEDRINE SULFATE 50 MG/ML
INJECTION, SOLUTION INTRAMUSCULAR; INTRAVENOUS; SUBCUTANEOUS PRN
Status: DISCONTINUED | OUTPATIENT
Start: 2021-12-22 | End: 2021-12-22

## 2021-12-22 RX ORDER — CIPROFLOXACIN 500 MG/1
500 TABLET, FILM COATED ORAL 2 TIMES DAILY
Qty: 6 TABLET | Refills: 0 | Status: SHIPPED | OUTPATIENT
Start: 2021-12-22 | End: 2021-12-25

## 2021-12-22 RX ORDER — LIDOCAINE HYDROCHLORIDE 20 MG/ML
INJECTION, SOLUTION INFILTRATION; PERINEURAL PRN
Status: DISCONTINUED | OUTPATIENT
Start: 2021-12-22 | End: 2021-12-22

## 2021-12-22 RX ORDER — FENTANYL CITRATE 50 UG/ML
INJECTION, SOLUTION INTRAMUSCULAR; INTRAVENOUS PRN
Status: DISCONTINUED | OUTPATIENT
Start: 2021-12-22 | End: 2021-12-22

## 2021-12-22 RX ORDER — CIPROFLOXACIN 500 MG/1
500 TABLET, FILM COATED ORAL ONCE
Status: DISCONTINUED | OUTPATIENT
Start: 2021-12-22 | End: 2021-12-22 | Stop reason: HOSPADM

## 2021-12-22 RX ORDER — PROPOFOL 10 MG/ML
INJECTION, EMULSION INTRAVENOUS PRN
Status: DISCONTINUED | OUTPATIENT
Start: 2021-12-22 | End: 2021-12-22

## 2021-12-22 RX ORDER — LIDOCAINE 40 MG/G
CREAM TOPICAL
Status: DISCONTINUED | OUTPATIENT
Start: 2021-12-22 | End: 2021-12-22 | Stop reason: HOSPADM

## 2021-12-22 RX ADMIN — FENTANYL CITRATE 25 MCG: 50 INJECTION, SOLUTION INTRAMUSCULAR; INTRAVENOUS at 14:23

## 2021-12-22 RX ADMIN — CEFAZOLIN SODIUM 2 G: 2 INJECTION, SOLUTION INTRAVENOUS at 13:56

## 2021-12-22 RX ADMIN — PROPOFOL 50 MG: 10 INJECTION, EMULSION INTRAVENOUS at 13:56

## 2021-12-22 RX ADMIN — SODIUM CHLORIDE, POTASSIUM CHLORIDE, SODIUM LACTATE AND CALCIUM CHLORIDE: 600; 310; 30; 20 INJECTION, SOLUTION INTRAVENOUS at 13:19

## 2021-12-22 RX ADMIN — PROPOFOL 150 MCG/KG/MIN: 10 INJECTION, EMULSION INTRAVENOUS at 13:56

## 2021-12-22 RX ADMIN — LIDOCAINE HYDROCHLORIDE 80 MG: 20 INJECTION, SOLUTION INFILTRATION; PERINEURAL at 13:56

## 2021-12-22 RX ADMIN — Medication 5 MG: at 14:19

## 2021-12-22 RX ADMIN — FENTANYL CITRATE 50 MCG: 50 INJECTION, SOLUTION INTRAMUSCULAR; INTRAVENOUS at 13:53

## 2021-12-22 NOTE — DISCHARGE INSTRUCTIONS
After the procedure you may experience some temporary discomfort at the injection site. SpaceOAR hydrogel patients typically report no prolonged discomfort from the implanted gel. You should be able to immediately resume your normal activities.    Medication instructions  Take 1 (500 mg) tablet of ciprofloxacin before you go to bed after your procedure. Keep taking 1 (500 mg) tablet every 12 hours for 3 days. This will help prevent an infection in your prostate.    If you have any pain, you can take your prescribed pain medication or over the counter pain medication, such as acetaminophen (Tylenol ) or ibuprofen (Advil , Motrin ).    Physical activity and exercise  You can drive and do your normal activities 24 hours after your procedure. Don t lift anything heavier than 10 pounds (4.5 kilograms) for 1 week after your procedure.    Eating and drinking  You can go back to your usual diet right away after your procedure.    Back to top   When to Call Your Healthcare Provider  Call your healthcare provider right away if you have:    Increasing pain or pain that doesn t get better after taking over-the-counter pain medication  A fever of 100.4  F (38  C) or higher  Chills  Trouble urinating  Blood in your stool (poop) or urine (pee)  Dizziness    [facility]   Adult Discharge Orders & Instructions     For 24 hours after surgery    1. Get plenty of rest.  A responsible adult must stay with you for at least 24 hours after you leave the hospital.   2. Do not drive or use heavy equipment.  If you have weakness or tingling, don't drive or use heavy equipment until this feeling goes away.  3. Do not drink alcohol.  4. Avoid strenuous or risky activities.  Ask for help when climbing stairs.   5. You may feel lightheaded.  If so, sit for a few minutes before standing.  Have someone help you get up.   6. You may have a slight fever. Call the doctor if your fever is over 100 F (37.7 C) (taken under the  tongue) or lasts longer than 24 hours.  7. You may have a dry mouth, a sore throat, muscle aches or trouble sleeping.  These should go away after 24 hours.  8. Do not make important or legal decisions.  We don t expect you to have any problems from the surgery or treatment you had today. Just in case, here s what to do if you have pain, upset stomach (nausea), bleeding or infection:  Pain:  Take medicines your doctor has prescribed or over-the-counter medicine they have suggested. Resting and using ice packs can help, too. For surgery on an arm or leg, raise it on a pillow to ease swelling. Call your doctor if these methods don t work.  Copyright [employer], Licensed under [employer]  Upset stomach (nausea):  Take anti-nausea medicine approved by your doctor. Drink clear liquids like apple juice, ginger ale, broth or 7-Up. Be sure to drink enough fluids. Rest can help, too. Move to normal foods when you re ready. Bleeding:  In the first 24 hours, you may see a little blood on your dressing, about the size of a quarter. You don t need to worry about this much blood, but if the blood spot keeps getting bigger:    Put pressure on the wound if you can, AND    Call your doctor.  Copyright [employer], Licensed under [employer]  Fever/Infection: Please call your doctor if you have any of these signs:    Redness    Swelling    Wound feels warm    Pain gets worse    Bad-smelling fluid leaks from wound    Fever or chills  Call your doctor for any of the followin.  It has been over 8 to 10 hours since surgery and you are still not able to urinate (pass water).    2.  Headache for over 24 hours.    3.  Numbness, tingling or weakness in your legs the day after surgery (if you had spinal anesthesia).    Nurse advice line: [phone]

## 2021-12-22 NOTE — BRIEF OP NOTE
[facility]    Brief Operative Note    Pre-operative diagnosis: Prostate cancer (H) [C61]  Post-operative diagnosis Same as pre-operative diagnosis    Procedure: Procedure(s):  spaceOar and fiducial marker placement  Surgeon: Surgeon(s) and Role:     * Ruddy Garcia MD - Primary  Anesthesia: Monitor Anesthesia Care   Estimated Blood Loss: None    Drains: None  Specimens: * No specimens in log *  Findings:   None.  Complications: None.  Implants: * No implants in log *

## 2021-12-22 NOTE — OP NOTE
Preop diagnosis: Prostate cancer    Postop diagnosis: Same    Procedure done:  #1 SpaceOAR placement  #2 fiducial marker placement.    Procedure    Patient brought to the open room and placed in a dorsolithotomy position after sedation has been induced.  He was draped and prepped in the usual surgical fashion.  Patient received preop antibiotics.  Prostate ultrasound was placed.  Prostate identified.  Fiducial marker was then placed at the right base, right apex, and left mid gland.  After this is done SpaceOAR solution was then prepared.  The needle was then placed in the midline just below prostate in the fatty tissue and then SpaceOar solution injected.  Patient presents for well.  No complications identified during procedure.  There was minimal bleeding during the operation.

## 2021-12-22 NOTE — ANESTHESIA CARE TRANSFER NOTE
Patient: Efraín Crouch    Procedure: Procedure(s):  spaceOar and fiducial marker placement       Diagnosis: Prostate cancer (H) [C61]  Diagnosis Additional Information: No value filed.    Anesthesia Type:   MAC     Note:    Oropharynx: oropharynx clear of all foreign objects and spontaneously breathing  Level of Consciousness: awake  Oxygen Supplementation: room air    Independent Airway: airway patency satisfactory and stable  Dentition: dentition unchanged  Vital Signs Stable: post-procedure vital signs reviewed and stable  Report to RN Given: handoff report given  Patient transferred to: Phase II    Handoff Report: Identifed the Patient, Identified the Reponsible Provider, Reviewed the pertinent medical history, Discussed the surgical course, Reviewed Intra-OP anesthesia mangement and issues during anesthesia, Set expectations for post-procedure period and Allowed opportunity for questions and acknowledgement of understanding      Vitals:  Vitals Value Taken Time   /54 12/22/21 1452   Temp 97.6  F (36.4  C) 12/22/21 1450   Pulse 59 12/22/21 1452   Resp 16 12/22/21 1450   SpO2 98 % 12/22/21 1457   Vitals shown include unvalidated device data.    Electronically Signed By: JAI Hung CRNA  December 22, 2021  2:58 PM

## 2021-12-22 NOTE — ANESTHESIA PREPROCEDURE EVALUATION
"Anesthesia Pre-Procedure Evaluation    Patient: Efraín Crouch   MRN: [id number] : 1950        Preoperative Diagnosis: Prostate cancer (H) [C61]    Procedure : Procedure(s):  spaceOar and fiducial marker placement          Past Medical History:   Diagnosis Date     Elevated prostate specific antigen (PSA)      HTN (hypertension)      Hx of ischemic right MCA stroke      Hyperlipidemia      Left atrial enlargement      Nephrolithiasis      YANETH (obstructive sleep apnea)      Spinal stenosis, lumbar      Type 2 diabetes mellitus, with long-term current use of insulin (H)       Past Surgical History:   Procedure Laterality Date     COLONOSCOPY      2016     EYE SURGERY      L vitreous hemorrhage     POSTERIOR LAMINECTOMY / DECOMPRESSION LUMBAR SPINE       r wrist and elbow      surgery      Allergies   Allergen Reactions     Doxycycline Rash     Other reaction(s): Edema  Got fungal skin infection from it previously, but retrial shows clear reaction. See clinic note dated 18.     Penicillins      Other reaction(s): Throat Swelling/Closing  \"Penicllin G\"     Sulfa Drugs Rash and Shortness Of Breath     Statins [Hmg-Coa-R Inhibitors] Other (See Comments)     asymptomatic CK elevation      Social History     Tobacco Use     Smoking status: Former Smoker     Packs/day: 1.50     Years: 20.00     Pack years: 30.00     Types: Cigarettes     Smokeless tobacco: Never Used     Tobacco comment: 35 years ago   Substance Use Topics     Alcohol use: Yes     Comment: rare      Wt Readings from Last 1 Encounters:   21 104.3 kg (230 lb)        Anesthesia Evaluation   Pt has had prior anesthetic. Type: General and MAC.        ROS/MED HX  ENT/Pulmonary:     (+) sleep apnea,     Neurologic:     (+) CVA, date: , without deficits,     Cardiovascular:     (+) hypertension-----Taking blood thinners Previous cardiac testing   Echo: Date: Results:    Stress Test: Date: Results:    ECG Reviewed: Date: 2021 Results:  - " NSR   Cath: Date: Results:      METS/Exercise Tolerance:     Hematologic:  - neg hematologic  ROS     Musculoskeletal:  - neg musculoskeletal ROS     GI/Hepatic:       Renal/Genitourinary:     (+) renal disease, type: CRI,     Endo:     (+) type II DM, Last HgA1c: 9.1, date: 12/17/2021, Using insulin, - not using insulin pump. Diabetic complications: retinopathy.     Psychiatric/Substance Use:  - neg psychiatric ROS     Infectious Disease:  - neg infectious disease ROS     Malignancy:  - neg malignancy ROS     Other:  - neg other ROS          Physical Exam    Airway        Mallampati: II   TM distance: > 3 FB   Neck ROM: full   Mouth opening: > 3 cm    Respiratory Devices and Support         Dental  no notable dental history         Cardiovascular   cardiovascular exam normal          Pulmonary   pulmonary exam normal                OUTSIDE LABS:  CBC: No results found for: WBC, HGB, HCT, PLT  BMP: No results found for: NA, POTASSIUM, CHLORIDE, CO2, BUN, CR, GLC  COAGS: No results found for: PTT, INR, FIBR  POC: No results found for: BGM, HCG, HCGS  HEPATIC: No results found for: ALBUMIN, PROTTOTAL, ALT, AST, GGT, ALKPHOS, BILITOTAL, BILIDIRECT, DEBBY  OTHER: No results found for: PH, LACT, A1C, AILYN, PHOS, MAG, LIPASE, AMYLASE, TSH, T4, T3, CRP, SED    Anesthesia Plan    ASA Status:  3   NPO Status:  NPO Appropriate    Anesthesia Type: MAC.     - Reason for MAC: straight local not clinically adequate              Consents    Anesthesia Plan(s) and associated risks, benefits, and realistic alternatives discussed. Questions answered and patient/representative(s) expressed understanding.     - Discussed: Risks, Benefits and Alternatives for BOTH SEDATION and the PROCEDURE were discussed     - Discussed with:  Patient      - Extended Intubation/Ventilatory Support Discussed: No.      - Patient is DNR/DNI Status: No    Use of blood products discussed: No .     Postoperative Care            Comments:    Other Comments:  The risks and benefits of anesthesia, and the alternatives where applicable, have been discussed with the patient, and they wish to proceed.            Shilo Delgado APRN CRNA

## 2021-12-22 NOTE — ANESTHESIA POSTPROCEDURE EVALUATION
Patient: Efraín Crouch    Procedure: Procedure(s):  spaceOar and fiducial marker placement       Diagnosis:Prostate cancer (H) [C61]  Diagnosis Additional Information: No value filed.    Anesthesia Type:  MAC    Note:  Disposition: Outpatient   Postop Pain Control: Uneventful            Sign Out: Well controlled pain   PONV: No   Neuro/Psych: Uneventful            Sign Out: Acceptable/Baseline neuro status   Airway/Respiratory: Uneventful            Sign Out: Acceptable/Baseline resp. status   CV/Hemodynamics: Uneventful            Sign Out: Acceptable CV status   Other NRE: NONE   DID A NON-ROUTINE EVENT OCCUR? No           Last vitals:  Vitals Value Taken Time   /54 12/22/21 1452   Temp 97.6  F (36.4  C) 12/22/21 1450   Pulse 59 12/22/21 1452   Resp 16 12/22/21 1450   SpO2 98 % 12/22/21 1457   Vitals shown include unvalidated device data.    Electronically Signed By: JAI Hung CRNA  December 22, 2021  2:59 PM

## 2021-12-30 ENCOUNTER — OFFICE VISIT (OUTPATIENT)
Dept: RADIATION THERAPY | Facility: OUTPATIENT CENTER | Age: 71
End: 2021-12-30
Payer: MEDICARE

## 2021-12-30 ENCOUNTER — HOSPITAL ENCOUNTER (OUTPATIENT)
Dept: MRI IMAGING | Facility: CLINIC | Age: 71
Discharge: HOME | End: 2021-12-30
Attending: RADIOLOGY | Admitting: RADIOLOGY
Payer: MEDICARE

## 2021-12-30 DIAGNOSIS — C61 PROSTATE CANCER (H): ICD-10-CM

## 2021-12-30 DIAGNOSIS — C61 PROSTATE CANCER (H): Primary | ICD-10-CM

## 2021-12-30 PROCEDURE — 72195 MRI PELVIS W/O DYE: CPT | Mod: TC,52,MG

## 2021-12-30 NOTE — LETTER
12/30/2021         RE: Efraín Crouch  [address] [address]        Dear Colleague,    Thank you for referring your patient, Efraín Crouch, to the [facility]. Please see a copy of my visit note below.    The patient underwent CT simulation.     Aren Madden M.D.  Department of Radiation Oncology  [facility]         Again, thank you for allowing me to participate in the care of your patient.        Sincerely,        Aren Madden MD

## 2021-12-30 NOTE — PROGRESS NOTES
The patient underwent CT simulation.     Aren Madden M.D.  Department of Radiation Oncology  [facility]

## 2022-01-10 ENCOUNTER — APPOINTMENT (OUTPATIENT)
Dept: RADIATION THERAPY | Facility: OUTPATIENT CENTER | Age: 72
End: 2022-01-10
Payer: MEDICARE

## 2022-01-11 ENCOUNTER — APPOINTMENT (OUTPATIENT)
Dept: RADIATION THERAPY | Facility: OUTPATIENT CENTER | Age: 72
End: 2022-01-11
Payer: MEDICARE

## 2022-01-12 ENCOUNTER — OFFICE VISIT (OUTPATIENT)
Dept: RADIATION THERAPY | Facility: OUTPATIENT CENTER | Age: 72
End: 2022-01-12
Payer: MEDICARE

## 2022-01-12 ENCOUNTER — APPOINTMENT (OUTPATIENT)
Dept: RADIATION THERAPY | Facility: OUTPATIENT CENTER | Age: 72
End: 2022-01-12
Payer: MEDICARE

## 2022-01-12 VITALS
SYSTOLIC BLOOD PRESSURE: 132 MMHG | DIASTOLIC BLOOD PRESSURE: 68 MMHG | WEIGHT: 224.6 LBS | OXYGEN SATURATION: 98 % | RESPIRATION RATE: 18 BRPM | HEART RATE: 56 BPM

## 2022-01-12 DIAGNOSIS — C61 PROSTATE CANCER (H): Primary | ICD-10-CM

## 2022-01-12 NOTE — PROGRESS NOTES
[facility]  SPECIALIZING IN BREAKTHROUGHS  Radiation Oncology    On Treatment Visit Note      Efraín Crouch      Date: 2022   MRN: [id number]   : 1950  Diagnosis: prostate cancer      Reason for Visit:  On Radiation Treatment Visit     Treatment Summary to Date  Treatment Site: prostate Current Dose: 750/7000 cGy Fractions: 3/28      Chemotherapy  Chemo concurrent with radx?: No    Subjective:  Doing well. No acute complaints.  No GI bother. No  bother. Mild fatigue. Having hot flashes from ADT, currently in manageeable range.     Nursing ROS:      Skin  Skin Reaction: 0 - No changes        Cardiovascular  Respiratory effort: 1 - Normal - without distress  Gastrointestinal  Diarrhea: 0 - None  Genitourinary  Urinary Status: 0 - Normal  Dysuria: 0 - None     Pain Assessment  0-10 Pain Scale: 0      Objective:   /68   Pulse 56   Resp 18   Wt 101.9 kg (224 lb 9.6 oz)   SpO2 98%   NAD    Labs:  CBC RESULTS: No results for input(s): WBC, RBC, HGB, HCT, MCV, MCH, MCHC, RDW, PLT in the last 64839 hours.  ELECTROLYTES:  Recent Labs   Lab Test 21  1548   *       Assessment:  71M with a diagnosis of prostate cancer, unfavorable intermediate risk, Vladimir score 4+3=7, PSA = 15.1, cT1cN0. He is undergoing definitive RT + ST-ADT.    Tolerating radiation therapy well.  All questions and concerns addressed.    Plan:   1. Continue current therapy.    2.  GI bother. Imodium PRN. Low fiber diet.  3.   bother. Ibuprofen PRN.  4. ST-ADT (Dr. Perales): #1 Eligard (3 month - 10/14/21)  5. Hot flashes. Will monitor. Discussed consideration of megace of Effexor if needed.     Mosaiq chart and setup information reviewed  Ports checked    Medication Review  Med list reviewed with patient?: Yes  Med Note: had second dose of Eligard ( 22.5 mg ) per Dr. Perales's order last week at [facility].    Educational Topic Discussed  Education Instructions: reviewed possible SE to expect and what to do to  help      Aren Madden MD

## 2022-01-12 NOTE — LETTER
2022         RE: Efraín Crouch  [address] [facility] [address]        Dear Colleague,    Thank you for referring your patient, Efraín Crouch, to the [facility]. Please see a copy of my visit note below.    [facility]  SPECIALIZING IN BREAKTHROUGHS  Radiation Oncology    On Treatment Visit Note      Efraín Crouch      Date: 2022   MRN: [id number]   : 1950  Diagnosis: prostate cancer      Reason for Visit:  On Radiation Treatment Visit     Treatment Summary to Date  Treatment Site: prostate Current Dose: 750/7000 cGy Fractions: 3/28      Chemotherapy  Chemo concurrent with radx?: No    Subjective:  Doing well. No acute complaints.  No GI bother. No  bother. Mild fatigue. Having hot flashes from ADT, currently in manageeable range.     Nursing ROS:      Skin  Skin Reaction: 0 - No changes        Cardiovascular  Respiratory effort: 1 - Normal - without distress  Gastrointestinal  Diarrhea: 0 - None  Genitourinary  Urinary Status: 0 - Normal  Dysuria: 0 - None     Pain Assessment  0-10 Pain Scale: 0      Objective:   /68   Pulse 56   Resp 18   Wt 101.9 kg (224 lb 9.6 oz)   SpO2 98%   NAD    Labs:  CBC RESULTS: No results for input(s): WBC, RBC, HGB, HCT, MCV, MCH, MCHC, RDW, PLT in the last 51477 hours.  ELECTROLYTES:  Recent Labs   Lab Test 21  1548   *       Assessment:  71M with a diagnosis of prostate cancer, unfavorable intermediate risk, Vladimir score 4+3=7, PSA = 15.1, cT1cN0. He is undergoing definitive RT + ST-ADT.    Tolerating radiation therapy well.  All questions and concerns addressed.    Plan:   1. Continue current therapy.    2.  GI bother. Imodium PRN. Low fiber diet.  3.   bother. Ibuprofen PRN.  4. ST-ADT (Dr. Perales): #1 Eligard (3 month - 10/14/21)  5. Hot flashes. Will monitor. Discussed consideration of megace of Effexor if needed.     Mosaiq chart and setup information reviewed  Ports checked    Medication Review  Med list  reviewed with patient?: Yes  Med Note: had second dose of Eligard ( 22.5 mg ) per Dr. Perales's order last week at [facility].    Educational Topic Discussed  Education Instructions: reviewed possible SE to expect and what to do to help      Aren Madden MD

## 2022-01-13 ENCOUNTER — APPOINTMENT (OUTPATIENT)
Dept: RADIATION THERAPY | Facility: OUTPATIENT CENTER | Age: 72
End: 2022-01-13
Payer: MEDICARE

## 2022-01-14 ENCOUNTER — APPOINTMENT (OUTPATIENT)
Dept: RADIATION THERAPY | Facility: OUTPATIENT CENTER | Age: 72
End: 2022-01-14
Payer: MEDICARE

## 2022-01-17 ENCOUNTER — APPOINTMENT (OUTPATIENT)
Dept: RADIATION THERAPY | Facility: OUTPATIENT CENTER | Age: 72
End: 2022-01-17
Payer: MEDICARE

## 2022-01-18 ENCOUNTER — APPOINTMENT (OUTPATIENT)
Dept: RADIATION THERAPY | Facility: OUTPATIENT CENTER | Age: 72
End: 2022-01-18
Payer: MEDICARE

## 2022-01-19 ENCOUNTER — OFFICE VISIT (OUTPATIENT)
Dept: RADIATION THERAPY | Facility: OUTPATIENT CENTER | Age: 72
End: 2022-01-19
Payer: MEDICARE

## 2022-01-19 ENCOUNTER — APPOINTMENT (OUTPATIENT)
Dept: RADIATION THERAPY | Facility: OUTPATIENT CENTER | Age: 72
End: 2022-01-19
Payer: MEDICARE

## 2022-01-19 VITALS — WEIGHT: 225.6 LBS | SYSTOLIC BLOOD PRESSURE: 122 MMHG | RESPIRATION RATE: 16 BRPM | DIASTOLIC BLOOD PRESSURE: 60 MMHG

## 2022-01-19 DIAGNOSIS — C61 PROSTATE CANCER (H): Primary | ICD-10-CM

## 2022-01-19 ASSESSMENT — PAIN SCALES - GENERAL: PAINLEVEL: MODERATE PAIN (4)

## 2022-01-19 NOTE — PROGRESS NOTES
[facility]  SPECIALIZING IN BREAKTHROUGHS  Radiation Oncology    On Treatment Visit Note      Efraín Crouch      Date: 2022   MRN: [id number]   : 1950  Diagnosis: prostate cancer      Reason for Visit:  On Radiation Treatment Visit     Treatment Summary to Date  Treatment Site: prostate Current Dose: 2000/7000 cGy Fractions:       Chemotherapy  Chemo concurrent with radx?: No    Subjective:  Doing well. No acute complaints.  No GI bother. Very mild  bother. Mild fatigue. Having hot flashes from ADT, currently in manageable range.     Nursing ROS:      Skin  Skin Reaction: 0 - No changes        Cardiovascular  Respiratory effort: 1 - Normal - without distress  Gastrointestinal  Diarrhea: 0 - None  Genitourinary  Urinary Status: 0 - Normal  Dysuria: 0 - None     Pain Assessment  0-10 Pain Scale: 0      Objective:   There were no vitals taken for this visit.    NAD    Labs:  CBC RESULTS: No results for input(s): WBC, RBC, HGB, HCT, MCV, MCH, MCHC, RDW, PLT in the last 82873 hours.  ELECTROLYTES:  Recent Labs   Lab Test 21  1548   *       Assessment:  71M with a diagnosis of prostate cancer, unfavorable intermediate risk, [place] score 4+3=7, PSA = 15.1, cT1cN0. He is undergoing definitive RT + ST-ADT.    Tolerating radiation therapy well.  All questions and concerns addressed.    Plan:   1. Continue current therapy.    2.  GI bother. Imodium PRN. Low fiber diet.  3.   bother. Ibuprofen PRN.  4. ST-ADT (Dr. Perales): #1 Eligard (3 month - 10/14/21); #2 (3 month - 22)  5. Hot flashes. Will monitor. Discussed consideration of megace of Effexor if needed.     Mosaiq chart and setup information reviewed  Ports checked    Medication Review  Med list reviewed with patient?: Yes  Med Note: had second dose of Eligard ( 22.5 mg ) per Dr. Perales's order last week at [facility].    Educational Topic Discussed  Education Instructions: reviewed possible SE to expect and what to do to  help      Aren Madden MD

## 2022-01-19 NOTE — LETTER
2022         RE: Efraín Crouch  [address] [place]. [facility] [id number]        Dear Colleague,    Thank you for referring your patient, Efraín Crouch, to the [facility]. Please see a copy of my visit note below.    [facility]  SPECIALIZING IN BREAKTHROUGHS  Radiation Oncology    On Treatment Visit Note      Efraín Crouch      Date: 2022   MRN: [id number]   : 1950  Diagnosis: prostate cancer      Reason for Visit:  On Radiation Treatment Visit     Treatment Summary to Date  Treatment Site: prostate Current Dose: 2000/7000 cGy Fractions:       Chemotherapy  Chemo concurrent with radx?: No    Subjective:  Doing well. No acute complaints.  No GI bother. Very mild  bother. Mild fatigue. Having hot flashes from ADT, currently in manageable range.     Nursing ROS:      Skin  Skin Reaction: 0 - No changes        Cardiovascular  Respiratory effort: 1 - Normal - without distress  Gastrointestinal  Diarrhea: 0 - None  Genitourinary  Urinary Status: 0 - Normal  Dysuria: 0 - None     Pain Assessment  0-10 Pain Scale: 0      Objective:   There were no vitals taken for this visit.    NAD    Labs:  CBC RESULTS: No results for input(s): WBC, RBC, HGB, HCT, MCV, MCH, MCHC, RDW, PLT in the last 92114 hours.  ELECTROLYTES:  Recent Labs   Lab Test 21  1548   *       Assessment:  71M with a diagnosis of prostate cancer, unfavorable intermediate risk, [place] score 4+3=7, PSA = 15.1, cT1cN0. He is undergoing definitive RT + ST-ADT.    Tolerating radiation therapy well.  All questions and concerns addressed.    Plan:   1. Continue current therapy.    2.  GI bother. Imodium PRN. Low fiber diet.  3.   bother. Ibuprofen PRN.  4. ST-ADT (Dr. Perales): #1 Eligard (3 month - 10/14/21); #2 (3 month - 22)  5. Hot flashes. Will monitor. Discussed consideration of megace of Effexor if needed.     Mosaiq chart and setup information reviewed  Ports checked    Medication Review  Med list  reviewed with patient?: Yes  Med Note: had second dose of Eligard ( 22.5 mg ) per Dr. Perales's order last week at [facility].    Educational Topic Discussed  Education Instructions: reviewed possible SE to expect and what to do to help      Aren Madden MD

## 2022-01-20 ENCOUNTER — APPOINTMENT (OUTPATIENT)
Dept: RADIATION THERAPY | Facility: OUTPATIENT CENTER | Age: 72
End: 2022-01-20
Payer: MEDICARE

## 2022-01-21 ENCOUNTER — APPOINTMENT (OUTPATIENT)
Dept: RADIATION THERAPY | Facility: OUTPATIENT CENTER | Age: 72
End: 2022-01-21
Payer: MEDICARE

## 2022-01-24 ENCOUNTER — APPOINTMENT (OUTPATIENT)
Dept: RADIATION THERAPY | Facility: OUTPATIENT CENTER | Age: 72
End: 2022-01-24
Payer: MEDICARE

## 2022-01-25 ENCOUNTER — APPOINTMENT (OUTPATIENT)
Dept: RADIATION THERAPY | Facility: OUTPATIENT CENTER | Age: 72
End: 2022-01-25
Payer: MEDICARE

## 2022-01-26 ENCOUNTER — OFFICE VISIT (OUTPATIENT)
Dept: RADIATION THERAPY | Facility: OUTPATIENT CENTER | Age: 72
End: 2022-01-26
Payer: MEDICARE

## 2022-01-26 ENCOUNTER — APPOINTMENT (OUTPATIENT)
Dept: RADIATION THERAPY | Facility: OUTPATIENT CENTER | Age: 72
End: 2022-01-26
Payer: MEDICARE

## 2022-01-26 VITALS
HEART RATE: 55 BPM | SYSTOLIC BLOOD PRESSURE: 124 MMHG | DIASTOLIC BLOOD PRESSURE: 62 MMHG | OXYGEN SATURATION: 55 % | WEIGHT: 226.8 LBS | RESPIRATION RATE: 18 BRPM

## 2022-01-26 DIAGNOSIS — C61 PROSTATE CANCER (H): Primary | ICD-10-CM

## 2022-01-26 NOTE — PROGRESS NOTES
[facility]  SPECIALIZING IN BREAKTHROUGHS  Radiation Oncology    On Treatment Visit Note      Efraín Crouch      Date: 2022   MRN: [id number]   : 1950  Diagnosis: prostate cancer      Reason for Visit:  On Radiation Treatment Visit     Treatment Summary to Date  Treatment Site: prostate Current Dose: 3250/7000 cGy Fractions:       Chemotherapy  Chemo concurrent with radx?: No    Subjective:  Doing well. No acute complaints.  No GI bother. Mild  bother. Slightly reduction in urinary flow, nocturia 2-3x (baseline 1-2x), no dysuria.  Already on Flomax once daily. Mild fatigue. Having hot flashes from ADT, currently in manageable range.     Nursing ROS:      Skin  Skin Reaction: 0 - No changes        Cardiovascular  Respiratory effort: 1 - Normal - without distress  Gastrointestinal  Diarrhea: 0 - None  Genitourinary  Urinary Status: 0 - Normal  Dysuria: 0 - None     Pain Assessment  0-10 Pain Scale: 0      Objective:   There were no vitals taken for this visit.  NAD    Labs:  CBC RESULTS: No results for input(s): WBC, RBC, HGB, HCT, MCV, MCH, MCHC, RDW, PLT in the last 86768 hours.  ELECTROLYTES:  Recent Labs   Lab Test 21  1548   *       Assessment:  71M with a diagnosis of prostate cancer, unfavorable intermediate risk, Vladimir score 4+3=7, PSA = 15.1, cT1cN0. He is undergoing definitive RT + ST-ADT.    Tolerating radiation therapy well.  All questions and concerns addressed.    Plan:   1. Continue current therapy.    2.  GI bother. Imodium PRN. Low fiber diet.  3.   bother. On flomax daily at baseline. Would consider BID if  bother worsens.  4. ST-ADT (Dr. Perales): #1 Eligard (3 month - 10/14/21); #2 (3 month - 22)  5. Hot flashes. Will monitor. Discussed consideration of megace of Effexor if needed.     Mosaiq chart and setup information reviewed  Ports checked    Medication Review  Med list reviewed with patient?: Yes  Med Note: had second dose of Eligard ( 22.5 mg  ) per Dr. Perales's order last week at [facility].    Educational Topic Discussed  Education Instructions: reviewed possible SE to expect and what to do to help      Aren Madden MD

## 2022-01-26 NOTE — LETTER
2022         RE: Efraín Crouch  [address], [place]. \[facility] [id number]        Dear Colleague,    Thank you for referring your patient, Efraín Crouch, to the [facility]. Please see a copy of my visit note below.    [facility]  SPECIALIZING IN BREAKTHROUGHS  Radiation Oncology    On Treatment Visit Note      Efraín Crouch      Date: 2022   MRN: [id number]   : 1950  Diagnosis: prostate cancer      Reason for Visit:  On Radiation Treatment Visit     Treatment Summary to Date  Treatment Site: prostate Current Dose: 3250/7000 cGy Fractions:       Chemotherapy  Chemo concurrent with radx?: No    Subjective:  Doing well. No acute complaints.  No GI bother. Mild  bother. Slightly reduction in urinary flow, nocturia 2-3x (baseline 1-2x), no dysuria.  Already on Flomax once daily. Mild fatigue. Having hot flashes from ADT, currently in manageable range.     Nursing ROS:      Skin  Skin Reaction: 0 - No changes        Cardiovascular  Respiratory effort: 1 - Normal - without distress  Gastrointestinal  Diarrhea: 0 - None  Genitourinary  Urinary Status: 0 - Normal  Dysuria: 0 - None     Pain Assessment  0-10 Pain Scale: 0      Objective:   There were no vitals taken for this visit.  NAD    Labs:  CBC RESULTS: No results for input(s): WBC, RBC, HGB, HCT, MCV, MCH, MCHC, RDW, PLT in the last 76259 hours.  ELECTROLYTES:  Recent Labs   Lab Test 21  1548   *       Assessment:  71M with a diagnosis of prostate cancer, unfavorable intermediate risk, [place] score 4+3=7, PSA = 15.1, cT1cN0. He is undergoing definitive RT + ST-ADT.    Tolerating radiation therapy well.  All questions and concerns addressed.    Plan:   1. Continue current therapy.    2.  GI bother. Imodium PRN. Low fiber diet.  3.   bother. On flomax daily at baseline. Would consider BID if  bother worsens.  4. ST-ADT (Dr. Perales): #1 Eligard (3 month - 10/14/21); #2 (3 month - 22)  5. Hot flashes. Will  monitor. Discussed consideration of megace of Effexor if needed.     Mosaiq chart and setup information reviewed  Ports checked    Medication Review  Med list reviewed with patient?: Yes  Med Note: had second dose of Eligard ( 22.5 mg ) per Dr. Perales's order last week at [facility].    Educational Topic Discussed  Education Instructions: reviewed possible SE to expect and what to do to help      Aren Madden MD

## 2022-01-28 ENCOUNTER — APPOINTMENT (OUTPATIENT)
Dept: RADIATION THERAPY | Facility: OUTPATIENT CENTER | Age: 72
End: 2022-01-28
Payer: MEDICARE

## 2022-01-31 ENCOUNTER — APPOINTMENT (OUTPATIENT)
Dept: RADIATION THERAPY | Facility: OUTPATIENT CENTER | Age: 72
End: 2022-01-31
Payer: MEDICARE

## 2022-02-01 ENCOUNTER — APPOINTMENT (OUTPATIENT)
Dept: RADIATION THERAPY | Facility: OUTPATIENT CENTER | Age: 72
End: 2022-02-01
Payer: MEDICARE

## 2022-02-02 ENCOUNTER — APPOINTMENT (OUTPATIENT)
Dept: RADIATION THERAPY | Facility: OUTPATIENT CENTER | Age: 72
End: 2022-02-02
Payer: MEDICARE

## 2022-02-02 ENCOUNTER — OFFICE VISIT (OUTPATIENT)
Dept: RADIATION THERAPY | Facility: OUTPATIENT CENTER | Age: 72
End: 2022-02-02
Payer: MEDICARE

## 2022-02-02 VITALS
DIASTOLIC BLOOD PRESSURE: 58 MMHG | WEIGHT: 227.6 LBS | SYSTOLIC BLOOD PRESSURE: 128 MMHG | RESPIRATION RATE: 18 BRPM | OXYGEN SATURATION: 97 % | HEART RATE: 55 BPM

## 2022-02-02 DIAGNOSIS — C61 PROSTATE CANCER (H): Primary | ICD-10-CM

## 2022-02-02 NOTE — LETTER
2022         RE: Efraín Crouch  [address] [facility] [address]        Dear Colleague,    Thank you for referring your patient, Efraín Crouch, to the [facility]. Please see a copy of my visit note below.    [facility]  SPECIALIZING IN BREAKTHROUGHS  Radiation Oncology    On Treatment Visit Note      Efraín Crouch      Date: 2022   MRN: [id number]   : 1950  Diagnosis: prostate cancer      Reason for Visit:  On Radiation Treatment Visit     Treatment Summary to Date  Treatment Site: prostate Current Dose: 4250/7000 cGy Fractions:       Chemotherapy  Chemo concurrent with radx?: No    Subjective:  Doing well. No acute complaints.  Single episode of GI bother today, resolved. More  bother. More reduction in urinary flow, nocturia 2-3x (baseline 1-2x), no dysuria.  Already on Flomax once daily. Mild fatigue. Having hot flashes from ADT, currently in manageable range.     Nursing ROS:      Skin  Skin Reaction: 0 - No changes        Cardiovascular  Respiratory effort: 1 - Normal - without distress  Gastrointestinal  Diarrhea: 0 - None  Genitourinary  Urinary Status: 0 - Normal  Dysuria: 0 - None     Pain Assessment  0-10 Pain Scale: 0      Objective:   /58   Pulse 55   Resp 18   Wt 103.2 kg (227 lb 9.6 oz)   SpO2 97%   NAD    Labs:  CBC RESULTS: No results for input(s): WBC, RBC, HGB, HCT, MCV, MCH, MCHC, RDW, PLT in the last 39794 hours.  ELECTROLYTES:  Recent Labs   Lab Test 21  1548   *       Assessment:  71M with a diagnosis of prostate cancer, unfavorable intermediate risk, Vladimir score 4+3=7, PSA = 15.1, cT1cN0. He is undergoing definitive RT + ST-ADT.    Tolerating radiation therapy well.  All questions and concerns addressed.    Plan:   1. Continue current therapy.    2.  GI bother. Imodium PRN. Low fiber diet.  3.   bother. On flomax daily at baseline. Would consider BID if  bother worsens.  4. ST-ADT (Dr. Perales): #1 Марина (3 month -  10/14/21); #2 (3 month - 1/7/22)  5. Hot flashes. Will monitor. Discussed consideration of megace of Effexor if needed.     Mosaiq chart and setup information reviewed  Ports checked    Medication Review  Med list reviewed with patient?: Yes  Med Note: had second dose of Eligard ( 22.5 mg ) per Dr. Perales's order last week at [facility].    Educational Topic Discussed  Education Instructions: reviewed possible SE to expect and what to do to help      Aren Madden MD

## 2022-02-02 NOTE — PROGRESS NOTES
[facility]  SPECIALIZING IN BREAKTHROUGHS  Radiation Oncology    On Treatment Visit Note      Efraín Crouch      Date: 2022   MRN: [id number]   : 1950  Diagnosis: prostate cancer      Reason for Visit:  On Radiation Treatment Visit     Treatment Summary to Date  Treatment Site: prostate Current Dose: 4250/7000 cGy Fractions:       Chemotherapy  Chemo concurrent with radx?: No    Subjective:  Doing well. No acute complaints.  Single episode of GI bother today, resolved. More  bother. More reduction in urinary flow, nocturia 2-3x (baseline 1-2x), no dysuria.  Already on Flomax once daily. Mild fatigue. Having hot flashes from ADT, currently in manageable range.     Nursing ROS:      Skin  Skin Reaction: 0 - No changes        Cardiovascular  Respiratory effort: 1 - Normal - without distress  Gastrointestinal  Diarrhea: 0 - None  Genitourinary  Urinary Status: 0 - Normal  Dysuria: 0 - None     Pain Assessment  0-10 Pain Scale: 0      Objective:   /58   Pulse 55   Resp 18   Wt 103.2 kg (227 lb 9.6 oz)   SpO2 97%   NAD    Labs:  CBC RESULTS: No results for input(s): WBC, RBC, HGB, HCT, MCV, MCH, MCHC, RDW, PLT in the last 77009 hours.  ELECTROLYTES:  Recent Labs   Lab Test 21  1548   *       Assessment:  71M with a diagnosis of prostate cancer, unfavorable intermediate risk, [place] score 4+3=7, PSA = 15.1, cT1cN0. He is undergoing definitive RT + ST-ADT.    Tolerating radiation therapy well.  All questions and concerns addressed.    Plan:   1. Continue current therapy.    2.  GI bother. Imodium PRN. Low fiber diet.  3.   bother. On flomax daily at baseline. Would consider BID if  bother worsens.  4. ST-ADT (Dr. Perales): #1 Eligard (3 month - 10/14/21); #2 (3 month - 22)  5. Hot flashes. Will monitor. Discussed consideration of megace of Effexor if needed.     Mosaiq chart and setup information reviewed  Ports checked    Medication Review  Med list reviewed with  patient?: Yes  Med Note: had second dose of Eligard ( 22.5 mg ) per Dr. Perales's order last week at [facility].    Educational Topic Discussed  Education Instructions: reviewed possible SE to expect and what to do to help      Aren Madden MD

## 2022-02-03 ENCOUNTER — APPOINTMENT (OUTPATIENT)
Dept: RADIATION THERAPY | Facility: OUTPATIENT CENTER | Age: 72
End: 2022-02-03
Payer: MEDICARE

## 2022-02-04 ENCOUNTER — APPOINTMENT (OUTPATIENT)
Dept: RADIATION THERAPY | Facility: OUTPATIENT CENTER | Age: 72
End: 2022-02-04
Payer: MEDICARE

## 2022-02-07 ENCOUNTER — APPOINTMENT (OUTPATIENT)
Dept: RADIATION THERAPY | Facility: OUTPATIENT CENTER | Age: 72
End: 2022-02-07
Payer: MEDICARE

## 2022-02-08 ENCOUNTER — APPOINTMENT (OUTPATIENT)
Dept: RADIATION THERAPY | Facility: OUTPATIENT CENTER | Age: 72
End: 2022-02-08
Payer: MEDICARE

## 2022-02-09 ENCOUNTER — OFFICE VISIT (OUTPATIENT)
Dept: RADIATION THERAPY | Facility: OUTPATIENT CENTER | Age: 72
End: 2022-02-09
Payer: MEDICARE

## 2022-02-09 ENCOUNTER — APPOINTMENT (OUTPATIENT)
Dept: RADIATION THERAPY | Facility: OUTPATIENT CENTER | Age: 72
End: 2022-02-09
Payer: MEDICARE

## 2022-02-09 VITALS — DIASTOLIC BLOOD PRESSURE: 83 MMHG | SYSTOLIC BLOOD PRESSURE: 142 MMHG | RESPIRATION RATE: 18 BRPM | WEIGHT: 228.4 LBS

## 2022-02-09 DIAGNOSIS — C61 PROSTATE CANCER (H): Primary | ICD-10-CM

## 2022-02-09 NOTE — LETTER
2022         RE: Efraín Crouch  [address] [facility] [facility] [address]        Dear Colleague,    Thank you for referring your patient, Efraín Crouch, to the [facility]. Please see a copy of my visit note below.    [facility]  SPECIALIZING IN BREAKTHROUGHS  Radiation Oncology    On Treatment Visit Note      Efraín Crouch      Date: 2022   MRN: [id number]   : 1950  Diagnosis: prostate cancer      Reason for Visit:  On Radiation Treatment Visit     Treatment Summary to Date  Treatment Site: prostate Current Dose: 5500/7000 cGy Fractions:       Chemotherapy  Chemo concurrent with radx?: No    Subjective:  Doing well. No acute complaints.  No diarrhea.  Stable  bother. Has some reduction in urinary flow, nocturia 2-3x (baseline 1-2x), no dysuria.  Already on Flomax once daily. Mild fatigue. Having hot flashes from ADT, currently in manageable range.     Nursing ROS:      Skin  Skin Reaction: 0 - No changes        Cardiovascular  Respiratory effort: 1 - Normal - without distress  Gastrointestinal  Diarrhea: 0 - None  Genitourinary  Urinary Status: 0 - Normal  Dysuria: 0 - None     Pain Assessment  0-10 Pain Scale: 0      Objective:   BP (!) 142/83   Resp 18   Wt 103.6 kg (228 lb 6.4 oz)   NAD    Labs:  CBC RESULTS: No results for input(s): WBC, RBC, HGB, HCT, MCV, MCH, MCHC, RDW, PLT in the last 15282 hours.  ELECTROLYTES:  Recent Labs   Lab Test 21  1548   *       Assessment:  71M with a diagnosis of prostate cancer, unfavorable intermediate risk, [place] score 4+3=7, PSA = 15.1, cT1cN0. He is undergoing definitive RT + ST-ADT.    Tolerating radiation therapy well.  All questions and concerns addressed.    Plan:   1. Continue current therapy.    2.  GI bother. Imodium PRN. Low fiber diet.  3.   bother. On flomax daily at baseline. Would consider BID if  bother worsens.  4. ST-ADT (Dr. Perales): #1 Eligard (3 month - 10/14/21); #2 (3 month - 22)  5. Hot  flashes. Will monitor. Discussed consideration of megace of Effexor if needed.     Mosaiq chart and setup information reviewed  Ports checked    Medication Review  Med list reviewed with patient?: Yes  Med Note: had second dose of Eligard ( 22.5 mg ) per Dr. Perales's order last week at [facility].    Educational Topic Discussed  Education Instructions: reviewed possible SE to expect and what to do to help      Aren Madden MD

## 2022-02-09 NOTE — PROGRESS NOTES
[facility]  SPECIALIZING IN BREAKTHROUGHS  Radiation Oncology    On Treatment Visit Note      Efraín Crouch      Date: 2022   MRN: [id number]   : 1950  Diagnosis: prostate cancer      Reason for Visit:  On Radiation Treatment Visit     Treatment Summary to Date  Treatment Site: prostate Current Dose: 5500/7000 cGy Fractions:       Chemotherapy  Chemo concurrent with radx?: No    Subjective:  Doing well. No acute complaints.  No diarrhea.  Stable  bother. Has some reduction in urinary flow, nocturia 2-3x (baseline 1-2x), no dysuria.  Already on Flomax once daily. Mild fatigue. Having hot flashes from ADT, currently in manageable range.     Nursing ROS:      Skin  Skin Reaction: 0 - No changes        Cardiovascular  Respiratory effort: 1 - Normal - without distress  Gastrointestinal  Diarrhea: 0 - None  Genitourinary  Urinary Status: 0 - Normal  Dysuria: 0 - None     Pain Assessment  0-10 Pain Scale: 0      Objective:   BP (!) 142/83   Resp 18   Wt 103.6 kg (228 lb 6.4 oz)   NAD    Labs:  CBC RESULTS: No results for input(s): WBC, RBC, HGB, HCT, MCV, MCH, MCHC, RDW, PLT in the last 21330 hours.  ELECTROLYTES:  Recent Labs   Lab Test 21  1548   *       Assessment:  71M with a diagnosis of prostate cancer, unfavorable intermediate risk, Vladimir score 4+3=7, PSA = 15.1, cT1cN0. He is undergoing definitive RT + ST-ADT.    Tolerating radiation therapy well.  All questions and concerns addressed.    Plan:   1. Continue current therapy.    2.  GI bother. Imodium PRN. Low fiber diet.  3.   bother. On flomax daily at baseline. Would consider BID if  bother worsens.  4. ST-ADT (Dr. Perales): #1 Eligard (3 month - 10/14/21); #2 (3 month - 22)  5. Hot flashes. Will monitor. Discussed consideration of megace of Effexor if needed.     Mosaiq chart and setup information reviewed  Ports checked    Medication Review  Med list reviewed with patient?: Yes  Med Note: had second dose of  Eligard ( 22.5 mg ) per Dr. Perales's order last week at [facility].    Educational Topic Discussed  Education Instructions: reviewed possible SE to expect and what to do to help      Aren Madden MD

## 2022-02-10 ENCOUNTER — APPOINTMENT (OUTPATIENT)
Dept: RADIATION THERAPY | Facility: OUTPATIENT CENTER | Age: 72
End: 2022-02-10
Payer: MEDICARE

## 2022-02-11 ENCOUNTER — APPOINTMENT (OUTPATIENT)
Dept: RADIATION THERAPY | Facility: OUTPATIENT CENTER | Age: 72
End: 2022-02-11
Payer: MEDICARE

## 2022-02-14 ENCOUNTER — APPOINTMENT (OUTPATIENT)
Dept: RADIATION THERAPY | Facility: OUTPATIENT CENTER | Age: 72
End: 2022-02-14
Payer: MEDICARE

## 2022-02-15 ENCOUNTER — APPOINTMENT (OUTPATIENT)
Dept: RADIATION THERAPY | Facility: OUTPATIENT CENTER | Age: 72
End: 2022-02-15
Payer: MEDICARE

## 2022-02-16 ENCOUNTER — OFFICE VISIT (OUTPATIENT)
Dept: RADIATION THERAPY | Facility: OUTPATIENT CENTER | Age: 72
End: 2022-02-16
Payer: MEDICARE

## 2022-02-16 ENCOUNTER — APPOINTMENT (OUTPATIENT)
Dept: RADIATION THERAPY | Facility: OUTPATIENT CENTER | Age: 72
End: 2022-02-16
Payer: MEDICARE

## 2022-02-16 VITALS
DIASTOLIC BLOOD PRESSURE: 60 MMHG | OXYGEN SATURATION: 97 % | RESPIRATION RATE: 18 BRPM | HEART RATE: 59 BPM | SYSTOLIC BLOOD PRESSURE: 128 MMHG | WEIGHT: 228 LBS

## 2022-02-16 DIAGNOSIS — C61 PROSTATE CANCER (H): Primary | ICD-10-CM

## 2022-02-16 ASSESSMENT — PAIN SCALES - GENERAL: PAINLEVEL: NO PAIN (0)

## 2022-02-16 NOTE — PROGRESS NOTES
[facility]  SPECIALIZING IN BREAKTHROUGHS  Radiation Oncology    On Treatment Visit Note      Efraín Crouch      Date: 2022   MRN: [id number]   : 1950  Diagnosis: prostate cancer      Reason for Visit:  On Radiation Treatment Visit     Treatment Summary to Date  Treatment Site: prostate Current Dose: 6750/7000 cGy Fractions:       Chemotherapy  Chemo concurrent with radx?: No    Subjective:  Doing well. No acute complaints.  No diarrhea.  Stable  bother. Has some reduction in urinary flow, nocturia 2-3x (baseline 1-2x), no dysuria.  Already on Flomax once daily. Mild fatigue. Having hot flashes from ADT, currently in manageable range.     Nursing ROS:      Skin  Skin Reaction: 0 - No changes        Cardiovascular  Respiratory effort: 1 - Normal - without distress  Gastrointestinal  Diarrhea: 0 - None  Genitourinary  Urinary Status: 0 - Normal  Dysuria: 0 - None     Pain Assessment  0-10 Pain Scale: 0      Objective:   /60   Pulse 59   Resp 18   Wt 103.4 kg (228 lb)   SpO2 97%   NAD    Labs:  CBC RESULTS: No results for input(s): WBC, RBC, HGB, HCT, MCV, MCH, MCHC, RDW, PLT in the last 91577 hours.  ELECTROLYTES:  Recent Labs   Lab Test 21  1548   *       Assessment:  71M with a diagnosis of prostate cancer, unfavorable intermediate risk, Vladimir score 4+3=7, PSA = 15.1, cT1cN0. He is undergoing definitive RT + ST-ADT.    Tolerating radiation therapy well.  All questions and concerns addressed.    Plan:   1. Continue current therapy.  EOT tomorrow. RTC in 1 month with PSA. Will see PA.  2.  GI bother. Imodium PRN. Low fiber diet.  3.   bother. On flomax daily at baseline. Would consider BID if  bother worsens.  4. ST-ADT (Dr. Perales): #1 Eligard (3 month - 10/14/21); #2 (3 month - 22)  5. Hot flashes. Will monitor. Discussed consideration of megace of Effexor if needed.     Mosaiq chart and setup information reviewed  Ports checked    Medication Review  Med  list reviewed with patient?: Yes  Med Note: had second dose of Eligard ( 22.5 mg ) per Dr. Perales's order last week at [facility].    Educational Topic Discussed  Education Instructions: reviewed possible SE to expect and what to do to help      Aren Madden MD

## 2022-02-16 NOTE — LETTER
2022         RE: Efraín Crouch  [address] [facility] [address]        Dear Colleague,    Thank you for referring your patient, Efraín Crouch, to the RAD[facility]ATION [facility]ENTER. Please see a copy of my visit note below.    [facility]  SPECIALIZING IN BREAKTHROUGHS  Radiation Oncology    On Treatment Visit Note      Efraín Crouch      Date: 2022   MRN: [id number]   : 1950  Diagnosis: prostate cancer      Reason for Visit:  On Radiation Treatment Visit     Treatment Summary to Date  Treatment Site: prostate Current Dose: 6750/7000 cGy Fractions:       Chemotherapy  Chemo concurrent with radx?: No    Subjective:  Doing well. No acute complaints.  No diarrhea.  Stable  bother. Has some reduction in urinary flow, nocturia 2-3x (baseline 1-2x), no dysuria.  Already on Flomax once daily. Mild fatigue. Having hot flashes from ADT, currently in manageable range.     Nursing ROS:      Skin  Skin Reaction: 0 - No changes        Cardiovascular  Respiratory effort: 1 - Normal - without distress  Gastrointestinal  Diarrhea: 0 - None  Genitourinary  Urinary Status: 0 - Normal  Dysuria: 0 - None     Pain Assessment  0-10 Pain Scale: 0      Objective:   /60   Pulse 59   Resp 18   Wt 103.4 kg (228 lb)   SpO2 97%   NAD    Labs:  CBC RESULTS: No results for input(s): WBC, RBC, HGB, HCT, MCV, MCH, MCHC, RDW, PLT in the last 87356 hours.  ELECTROLYTES:  Recent Labs   Lab Test 21  1548   *       Assessment:  71M with a diagnosis of prostate cancer, unfavorable intermediate risk, Vladimir score 4+3=7, PSA = 15.1, cT1cN0. He is undergoing definitive RT + ST-ADT.    Tolerating radiation therapy well.  All questions and concerns addressed.    Plan:   1. Continue current therapy.  EOT tomorrow. RTC in 1 month with PSA. Will see PA.  2.  GI bother. Imodium PRN. Low fiber diet.  3.   bother. On flomax daily at baseline. Would consider BID if  bother worsens.  4. ST-ADT (Dr. Perales):  #1 Eligard (3 month - 10/14/21); #2 (3 month - 1/7/22)  5. Hot flashes. Will monitor. Discussed consideration of megace of Effexor if needed.     Mosaiq chart and setup information reviewed  Ports checked    Medication Review  Med list reviewed with patient?: Yes  Med Note: had second dose of Eligard ( 22.5 mg ) per Dr. Perales's order last week at [facility].    Educational Topic Discussed  Education Instructions: reviewed possible SE to expect and what to do to help      Aren Madden MD

## 2022-02-17 ENCOUNTER — APPOINTMENT (OUTPATIENT)
Dept: RADIATION THERAPY | Facility: OUTPATIENT CENTER | Age: 72
End: 2022-02-17
Payer: MEDICARE

## 2022-03-02 ENCOUNTER — TELEPHONE (OUTPATIENT)
Dept: RADIATION THERAPY | Facility: OUTPATIENT CENTER | Age: 72
End: 2022-03-02
Payer: MEDICARE

## 2022-03-02 NOTE — TELEPHONE ENCOUNTER
"Call received from Natalie reporting that Efraín was extremely fatigued, more than she had ever seen and she wanted RN to call.    Rn reached out to Efraín and he shared he felt \"progressive fatigue' since finishing radiation 2 weeks ago. He denied fever, chills, concerns for urinary infection, diarrhea. No symptom changes beyond fatigue. He reported eating and drinking well. He had been out for a walk yesterday.    Rn update MD - options - offer clinic visit on Friday or nurse phone check in next week.  Pt is at the usual point, two weeks after radiation, where side effects are most noticeable.    Efraín said he would like to do a nurse phone check in next week and appreciated the check in.  "

## 2022-03-09 ENCOUNTER — TELEPHONE (OUTPATIENT)
Dept: RADIATION THERAPY | Facility: OUTPATIENT CENTER | Age: 72
End: 2022-03-09
Payer: MEDICARE

## 2022-03-09 NOTE — TELEPHONE ENCOUNTER
"Follow up RN call regarding fatigue/malaise s/p radiation and ongoing eligard injections (at [facility] with urologist) after phone call last week 3/2/22.    RN called Efraín to check in on side effects, specifically fatigue, s/p radiation.   Efraín shared \"the weekend was pretty rough, but now I do feel some improvoment\".  Efraín denied any diarrhea, N/V. He reiterated he was starting to feel improvement in energy and activity level.    RN asked if he felt comfortable sticking with the follow up plan of 3/18/22 and he voiced agreement.  RN asked him to call back if any further concerns prior to the clinic visit.  "

## 2022-03-15 ENCOUNTER — TRANSFERRED RECORDS (OUTPATIENT)
Dept: HEALTH INFORMATION MANAGEMENT | Facility: CLINIC | Age: 72
End: 2022-03-15
Payer: MEDICARE

## 2022-03-18 ENCOUNTER — OFFICE VISIT (OUTPATIENT)
Dept: RADIATION THERAPY | Facility: OUTPATIENT CENTER | Age: 72
End: 2022-03-18
Payer: MEDICARE

## 2022-03-18 VITALS
BODY MASS INDEX: 32.48 KG/M2 | WEIGHT: 232 LBS | SYSTOLIC BLOOD PRESSURE: 140 MMHG | OXYGEN SATURATION: 99 % | RESPIRATION RATE: 12 BRPM | HEART RATE: 52 BPM | DIASTOLIC BLOOD PRESSURE: 70 MMHG | HEIGHT: 71 IN

## 2022-03-18 DIAGNOSIS — C61 PROSTATE CANCER (H): Primary | ICD-10-CM

## 2022-03-18 NOTE — PROGRESS NOTES
"   Department of Radiation Oncology  Radiation Therapy Center  [facility] [place] [address]  [phone]       Radiation Oncology Follow-up Visit  2022      Efraín Crouch  MRN: [id number]   : 1950     DISEASE TREATED:   Prostate adenocarcinoma     Completed definitive radiation therapy with short term ADT. Started on Eligard on 10/14/21 with Urology. Delivered by Urology    INTERVAL SINCE COMPLETION OF RADIATION THERAPY:   One month    SUBJECTIVE:   He returns with his wife Stefani. Gets up 3 to 4 times per night to urinate. On Flomax pre prostate cancer diagnosis. On Plavix and ASA for stroke. He is starting an exercise regimen. Will use elliptical and walking. Has two dogs, one is a Faroese Fulton. Denies blood in urine or stool.     ROS: 14 point review of systems is negative other than what is stated in HPI    PHYSICAL EXAM:  BP (!) 140/70 (BP Location: Right arm, Patient Position: Sitting, Cuff Size: Adult Regular)   Pulse 52   Resp 12   Ht 1.803 m (5' 11\")   Wt 105.2 kg (232 lb)   SpO2 99%   BMI 32.36 kg/m  Gen: Alert, in NAD  Eyes: PERRL, EOMI, sclera anicteric  HENT     Head: NC/AT     Ears: No external auricular lesions     Nose/sinus: No rhinorrhea or epistaxis     Oral Cavity/Oropharynx: MMM  Neck: Supple, full ROM, no LAD  Pulm: No wheezing, stridor or respiratory distress  CV: Well-perfused, no cyanosis, no pedal edema  Abdominal: Soft, nontender, nondistended, no hepatomegaly  Back: No step-offs or pain to palpation along the thoracolumbar spine, no CVA tenderness  Musculoskeletal: Normal bulk and tone   Skin: Normal color and turgor  Neurologic: A/Ox3, CN II-XII intact  Psychiatric: Appropriate mood and affect    LABS AND IMAGING:  [facility].31      IMPRESSION:   Mr. Crouch is a 71 year old male with a prostate adenocarcinoma cT1c N0 who underwent definitive RT + ST ADT.   Doing well. No GI or  bother    PLAN:   RTO in 6 months " with me after PSA    Ileana PURI

## 2022-03-18 NOTE — LETTER
"    3/18/2022         RE: Efraín Crouch  [address] [facility] [facility] [address]        Dear Colleague,    Thank you for referring your patient, Efraín Crouch, to the RAD[facility]ATION [facility]Y CENTER. Please see a copy of my visit note below.       Department of Radiation Oncology  Radiation Therapy Center  [facility]  [place] [phone]       Radiation Oncology Follow-up Visit  2022      Efraín Crouch  MRN: [id number]   : 1950     DISEASE TREATED:   Prostate adenocarcinoma     Completed definitive radiation therapy with short term ADT. Started on Eligard on 10/14/21 with Urology. Delivered by Urology    INTERVAL SINCE COMPLETION OF RADIATION THERAPY:   One month    SUBJECTIVE:   He returns with his wife Stefani. Gets up 3 to 4 times per night to urinate. On Flomax pre prostate cancer diagnosis. On Plavix and ASA for stroke. He is starting an exercise regimen. Will use elliptical and walking. Has two dogs, one is a Moroccan Fulton. Denies blood in urine or stool.     ROS: 14 point review of systems is negative other than what is stated in HPI    PHYSICAL EXAM:  BP (!) 140/70 (BP Location: Right arm, Patient Position: Sitting, Cuff Size: Adult Regular)   Pulse 52   Resp 12   Ht 1.803 m (5' 11\")   Wt 105.2 kg (232 lb)   SpO2 99%   BMI 32.36 kg/m  Gen: Alert, in NAD  Eyes: PERRL, EOMI, sclera anicteric  HENT     Head: NC/AT     Ears: No external auricular lesions     Nose/sinus: No rhinorrhea or epistaxis     Oral Cavity/Oropharynx: MMM  Neck: Supple, full ROM, no LAD  Pulm: No wheezing, stridor or respiratory distress  CV: Well-perfused, no cyanosis, no pedal edema  Abdominal: Soft, nontender, nondistended, no hepatomegaly  Back: No step-offs or pain to palpation along the thoracolumbar spine, no CVA tenderness  Musculoskeletal: Normal bulk and tone   Skin: Normal color and turgor  Neurologic: A/Ox3, CN II-XII intact  Psychiatric: Appropriate mood and affect    LABS AND " IMAGING:  [facility] lab PSA on 3/15/2022 = 0.31      IMPRESSION:   Mr. Crouch is a 71 year old male with a prostate adenocarcinoma cT1c N0 who underwent definitive RT + ST ADT.   Doing well. No GI or  bother    PLAN:   RTO in 6 months with me after PSA    Ileana PURI

## 2022-09-16 ENCOUNTER — OFFICE VISIT (OUTPATIENT)
Dept: RADIATION THERAPY | Facility: OUTPATIENT CENTER | Age: 72
End: 2022-09-16
Payer: MEDICARE

## 2022-09-16 VITALS
SYSTOLIC BLOOD PRESSURE: 155 MMHG | RESPIRATION RATE: 12 BRPM | TEMPERATURE: 98.3 F | HEART RATE: 59 BPM | OXYGEN SATURATION: 98 % | DIASTOLIC BLOOD PRESSURE: 73 MMHG

## 2022-09-16 DIAGNOSIS — C61 PROSTATE CANCER (H): Primary | ICD-10-CM

## 2022-09-16 NOTE — LETTER
2022         RE: Efraín Crouch  [address] [facility] [address]        Dear Colleague,    Thank you for referring your patient, Efraín Crouch, to the RAD[facility]ATION [facility]APY CENTER. Please see a copy of my visit note below.       Department of Radiation Oncology  Radiation Therapy Center  [facility]  [place] [phone]       Radiation Oncology Follow-up Visit  2022      Efraín Crouch  MRN: [id number]   : 1950     DISEASE TREATED: Prostate adenocarcinoma, unfavorable intermediate risk, [place] score 4+3=7, PSA = 15.1,  STAGE:  cT1cN0  RADIATION THERAPY DELIVERED: 7000 cGy x 29 fractions   RADIATION THERAPY SITE: Prostate  DATE OF RADIATION THERAPY: 1/10/22 to 22  INTERVAL SINCE COMPLETION OF RADIATION THERAPY: 7 months    HISTORY OF PRESENT ILLNESS:   The patient is a 72 year old year old man who was found to have an elevated PSA with a prostate biopsy confirmation of adenocarcinoma of the prostate. He has been evaluated by Dr. Perales of Urology who referred the patient here for consideration of definitive radiotherapy. He had an initial screening PSA on 21 that was elevated to 15.16. Repeat PSA returned on 21 as 11.87. He underwent transrectal ultrasound-guided prostate biopsy on 21 that demonstrated [place] 4+3=7 adenocarcinoma of the prostate in 11/12 cores.      Dr. Perales in Urology initiated ST- ADT   #1 Eligard on 10/14/2021, 3 month  #2 Eligard on 2022, 3 month      Past Medical History:   Diagnosis Date     Elevated prostate specific antigen (PSA)      HTN (hypertension)      Hx of ischemic right MCA stroke      Hyperlipidemia      Left atrial enlargement      Nephrolithiasis      YANETH (obstructive sleep apnea)      Spinal stenosis, lumbar      Type 2 diabetes mellitus, with long-term current use of insulin (H)      SUBJECTIVE:   He returns with his wife Stefani. Gets up 3 to 4 times per night to urinate. On Flomax pre  prostate cancer diagnosis. On Plavix and ASA for stroke. He is mostly sedentary. Walks his dogs.Has two dogs, one is a [place] Fulton. Denies blood in urine or stool. Energy is adequate.     He see's Hematology for normocytic anemia. Colonoscopy and EGD done on 6/27/22 normal colon and rectum, small hernia.      ROS: 14 point review of systems is negative other than what is stated in HPI    PHYSICAL EXAM:  BP (!) 155/73 (BP Location: Left arm, Patient Position: Sitting, Cuff Size: Adult Large)   Pulse 59   Temp 98.3  F (36.8  C)   Resp 12   SpO2 98%   Gen: Alert, in NAD  Eyes: PERRL, EOMI, sclera anicteric  HENT     Head: NC/AT     Ears: No external auricular lesions     Nose/sinus: No rhinorrhea or epistaxis     Oral Cavity/Oropharynx: MMM  Neck: Supple, full ROM, no LAD  Pulm: No wheezing, stridor or respiratory distress  CV: Well-perfused, no cyanosis, no pedal edema  Abdominal: Soft, nontender, nondistended, no hepatomegaly  Back: No step-offs or pain to palpation along the thoracolumbar spine, no CVA tenderness  Musculoskeletal: Normal bulk and tone   Skin: Normal color and turgor  Neurologic: A/Ox3, CN II-XII intact  Psychiatric: Appropriate mood and affect    LABS AND IMAGING:  [facility] lab PSA on 3/15/2022 = 0.31  [facility] lab PSA on 6/29/22 = < 0.130  [facility] lab PSA 9/14/22 = 0.329    IMPRESSION:   Mr. Crouch is a 72 year old male with a prostate adenocarcinoma cT1c N0 who underwent definitive RT + ST ADT.   Doing well. No GI or  bother. Slight elevation of PSA on most recent lab. May reflect testosterone recovery. Will discuss with Dr Madden.     PLAN:   RTO in 6 months with me after PSA    Ileana PURI

## 2022-09-16 NOTE — PROGRESS NOTES
Department of Radiation Oncology  Radiation Therapy Center  [facility] [place] [address]  [phone]       Radiation Oncology Follow-up Visit  2022      Efraín Crouch  MRN: [id number]   : 1950     DISEASE TREATED: Prostate adenocarcinoma, unfavorable intermediate risk, Vladimir score 4+3=7, PSA = 15.1,  STAGE:  cT1cN0  RADIATION THERAPY DELIVERED: 7000 cGy x 29 fractions   RADIATION THERAPY SITE: Prostate  DATE OF RADIATION THERAPY: 1/10/22 to 22  INTERVAL SINCE COMPLETION OF RADIATION THERAPY: 7 months    HISTORY OF PRESENT ILLNESS:   The patient is a 72 year old year old man who was found to have an elevated PSA with a prostate biopsy confirmation of adenocarcinoma of the prostate. He has been evaluated by Dr. Perales of Urology who referred the patient here for consideration of definitive radiotherapy. He had an initial screening PSA on 21 that was elevated to 15.16. Repeat PSA returned on 21 as 11.87. He underwent transrectal ultrasound-guided prostate biopsy on 21 that demonstrated Vladimir 4+3=7 adenocarcinoma of the prostate in 11/12 cores.      Dr. Perales in Urology initiated ST- ADT   #1 Eligard on 10/14/2021, 3 month  #2 Eligard on 2022, 3 month      Past Medical History:   Diagnosis Date     Elevated prostate specific antigen (PSA)      HTN (hypertension)      Hx of ischemic right MCA stroke      Hyperlipidemia      Left atrial enlargement      Nephrolithiasis      YANETH (obstructive sleep apnea)      Spinal stenosis, lumbar      Type 2 diabetes mellitus, with long-term current use of insulin (H)      SUBJECTIVE:   He returns with his wife Stefani. Gets up 3 to 4 times per night to urinate. On Flomax pre prostate cancer diagnosis. On Plavix and ASA for stroke. He is mostly sedentary. Walks his dogs.Has two dogs, one is a [place]an Fulton. Denies blood in urine or stool. Energy is adequate.     He see's Hematology for normocytic anemia.  Colonoscopy and EGD done on 6/27/22 normal colon and rectum, small hernia.      ROS: 14 point review of systems is negative other than what is stated in HPI    PHYSICAL EXAM:  BP (!) 155/73 (BP Location: Left arm, Patient Position: Sitting, Cuff Size: Adult Large)   Pulse 59   Temp 98.3  F (36.8  C)   Resp 12   SpO2 98%   Gen: Alert, in NAD  Eyes: PERRL, EOMI, sclera anicteric  HENT     Head: NC/AT     Ears: No external auricular lesions     Nose/sinus: No rhinorrhea or epistaxis     Oral Cavity/Oropharynx: MMM  Neck: Supple, full ROM, no LAD  Pulm: No wheezing, stridor or respiratory distress  CV: Well-perfused, no cyanosis, no pedal edema  Abdominal: Soft, nontender, nondistended, no hepatomegaly  Back: No step-offs or pain to palpation along the thoracolumbar spine, no CVA tenderness  Musculoskeletal: Normal bulk and tone   Skin: Normal color and turgor  Neurologic: A/Ox3, CN II-XII intact  Psychiatric: Appropriate mood and affect    LABS AND IMAGING:  [facility] lab PSA on 3/15/2022 = 0.31  [facility] lab PSA on 6/29/22 = < 0.130  [facility] lab PSA 9/14/22 = 0.329    IMPRESSION:   Mr. Crouch is a 72 year old male with a prostate adenocarcinoma cT1c N0 who underwent definitive RT + ST ADT.   Doing well. No GI or  bother. Slight elevation of PSA on most recent lab. May reflect testosterone recovery. Will discuss with Dr Madden.     PLAN:   RTO in 6 months with me after PSA    Ileana PURI

## 2023-03-14 ENCOUNTER — OFFICE VISIT (OUTPATIENT)
Dept: RADIATION THERAPY | Facility: OUTPATIENT CENTER | Age: 73
End: 2023-03-14
Payer: MEDICARE

## 2023-03-14 VITALS
DIASTOLIC BLOOD PRESSURE: 68 MMHG | BODY MASS INDEX: 33.74 KG/M2 | RESPIRATION RATE: 12 BRPM | SYSTOLIC BLOOD PRESSURE: 149 MMHG | WEIGHT: 241 LBS | OXYGEN SATURATION: 99 % | HEIGHT: 71 IN | HEART RATE: 57 BPM

## 2023-03-14 DIAGNOSIS — C61 PROSTATE CANCER (H): Primary | ICD-10-CM

## 2023-03-14 NOTE — LETTER
3/14/2023         RE: Efraín Crouch  [address] [address] [facility] [address]        Dear Colleague,    Thank you for referring your patient, Efraín Crouch, to the RADIATION THERAPY CENTER. Please see a copy of my visit note below.       Department of Radiation Oncology  Radiation Therapy Center  [facility]  [place] [phone]       Radiation Oncology Follow-up Visit  3/14/23      Efraín Crouch  MRN: [id number]   : 1950     DISEASE TREATED: Prostate adenocarcinoma, unfavorable intermediate risk, [place] score 4+3=7, PSA = 15.1,  STAGE:  cT1cN0  RADIATION THERAPY DELIVERED: 7000 cGy x 29 fractions   RADIATION THERAPY SITE: Prostate  DATE OF RADIATION THERAPY: 1/10/22 to 22  INTERVAL SINCE COMPLETION OF RADIATION THERAPY: 7 months    HISTORY OF PRESENT ILLNESS:   The patient is a 72 year old year old man who was found to have an elevated PSA with a prostate biopsy confirmation of adenocarcinoma of the prostate. He has been evaluated by Dr. Perales of Urology who referred the patient here for consideration of definitive radiotherapy. He had an initial screening PSA on 21 that was elevated to 15.16. Repeat PSA returned on 21 as 11.87. He underwent transrectal ultrasound-guided prostate biopsy on 21 that demonstrated Vladimir 4+3=7 adenocarcinoma of the prostate in 11/12 cores.      Dr. Perales in Urology initiated ST- ADT   #1 Eligard on 10/14/2021, 3 month  #2 Eligard on 2022, 3 month      Past Medical History:   Diagnosis Date     Elevated prostate specific antigen (PSA)      HTN (hypertension)      Hx of ischemic right MCA stroke      Hyperlipidemia      Left atrial enlargement      Nephrolithiasis      YANETH (obstructive sleep apnea)      Spinal stenosis, lumbar      Type 2 diabetes mellitus, with long-term current use of insulin (H)      SUBJECTIVE:   Gets up 3 to 4 times per night to urinate. On Flomax pre prostate cancer diagnosis. On Plavix and ASA  "for stroke. He is mostly sedentary. Walks his dogs.Has one dog, Luan Marin. Denies blood in urine or stool. Energy is adequate.     He see's Hematology for normocytic anemia. Colonoscopy and EGD done on 6/27/22 normal colon and rectum, small hernia.      ROS: 14 point review of systems is negative other than what is stated in HPI    PHYSICAL EXAM:  BP (!) 149/68   Pulse 57   Resp 12   Ht 1.803 m (5' 11\")   Wt 109.3 kg (241 lb)   SpO2 99%   BMI 33.61 kg/m  Gen: Alert, in NAD  Eyes: PERRL, EOMI, sclera anicteric  HENT     Head: NC/AT     Ears: No external auricular lesions     Nose/sinus: No rhinorrhea or epistaxis     Oral Cavity/Oropharynx: MMM  Neck: Supple, full ROM, no LAD  Pulm: No wheezing, stridor or respiratory distress  CV: Well-perfused, no cyanosis, no pedal edema  Abdominal: Soft, nontender, nondistended, no hepatomegaly  Back: No step-offs or pain to palpation along the thoracolumbar spine, no CVA tenderness  Musculoskeletal: Normal bulk and tone   Skin: Normal color and turgor  Neurologic: A/Ox3, CN II-XII intact  Psychiatric: Appropriate mood and affect    LABS AND IMAGING:  [facility] lab PSA on 3/15/2022 = 0.31  [facility] lab PSA on 6/29/22 = < 0.130  [facility] lab PSA 9/14/22 = 0.329  [facility] lab PSA 2/13/23 = 0.206    IMPRESSION:   Mr. Crouch is a 72 year old male with a prostate adenocarcinoma cT1c N0 who underwent definitive RT + ST ADT.   Seeing Urology Dr Perales.  Doing well. No GI or  bother.  Trying to loose wt    PLAN:   RTO in 6 months with me after PSA.     Ileana PURI    "

## 2023-03-14 NOTE — PROGRESS NOTES
Department of Radiation Oncology  Radiation Therapy Center  [facility] [place] [address]  [phone]       Radiation Oncology Follow-up Visit  3/14/23      Efraín Crouch  MRN: [id number]   : 1950     DISEASE TREATED: Prostate adenocarcinoma, unfavorable intermediate risk, Vladimir score 4+3=7, PSA = 15.1,  STAGE:  cT1cN0  RADIATION THERAPY DELIVERED: 7000 cGy x 29 fractions   RADIATION THERAPY SITE: Prostate  DATE OF RADIATION THERAPY: 1/10/22 to 22  INTERVAL SINCE COMPLETION OF RADIATION THERAPY: 7 months    HISTORY OF PRESENT ILLNESS:   The patient is a 72 year old year old man who was found to have an elevated PSA with a prostate biopsy confirmation of adenocarcinoma of the prostate. He has been evaluated by Dr. Perales of Urology who referred the patient here for consideration of definitive radiotherapy. He had an initial screening PSA on 21 that was elevated to 15.16. Repeat PSA returned on 21 as 11.87. He underwent transrectal ultrasound-guided prostate biopsy on 21 that demonstrated Vladimir 4+3=7 adenocarcinoma of the prostate in 11/12 cores.      Dr. Perales in Urology initiated ST- ADT   #1 Eligard on 10/14/2021, 3 month  #2 Eligard on 2022, 3 month      Past Medical History:   Diagnosis Date     Elevated prostate specific antigen (PSA)      HTN (hypertension)      Hx of ischemic right MCA stroke      Hyperlipidemia      Left atrial enlargement      Nephrolithiasis      YANETH (obstructive sleep apnea)      Spinal stenosis, lumbar      Type 2 diabetes mellitus, with long-term current use of insulin (H)      SUBJECTIVE:   Gets up 3 to 4 times per night to urinate. On Flomax pre prostate cancer diagnosis. On Plavix and ASA for stroke. He is mostly sedentary. Walks his dogs.Has one dog, Luan Marin. Denies blood in urine or stool. Energy is adequate.     He see's Hematology for normocytic anemia. Colonoscopy and EGD done on 22 normal colon and  "rectum, small hernia.      ROS: 14 point review of systems is negative other than what is stated in HPI    PHYSICAL EXAM:  BP (!) 149/68   Pulse 57   Resp 12   Ht 1.803 m (5' 11\")   Wt 109.3 kg (241 lb)   SpO2 99%   BMI 33.61 kg/m  Gen: Alert, in NAD  Eyes: PERRL, EOMI, sclera anicteric  HENT     Head: NC/AT     Ears: No external auricular lesions     Nose/sinus: No rhinorrhea or epistaxis     Oral Cavity/Oropharynx: MMM  Neck: Supple, full ROM, no LAD  Pulm: No wheezing, stridor or respiratory distress  CV: Well-perfused, no cyanosis, no pedal edema  Abdominal: Soft, nontender, nondistended, no hepatomegaly  Back: No step-offs or pain to palpation along the thoracolumbar spine, no CVA tenderness  Musculoskeletal: Normal bulk and tone   Skin: Normal color and turgor  Neurologic: A/Ox3, CN II-XII intact  Psychiatric: Appropriate mood and affect    LABS AND IMAGING:  [facility] lab PSA on 3/15/2022 = 0.31  [facility] lab PSA on 6/29/22 = < 0.130  [facility] lab PSA 9/14/22 = 0.329  [facility] lab PSA 2/13/23 = 0.206    IMPRESSION:   Mr. Crouch is a 72 year old male with a prostate adenocarcinoma cT1c N0 who underwent definitive RT + ST ADT.   Seeing Urology Dr Perales.  Doing well. No GI or  bother.  Trying to loose wt    PLAN:   RTO in 6 months with me after PSA.     Ileana PURI      "

## 2023-09-15 ENCOUNTER — OFFICE VISIT (OUTPATIENT)
Dept: RADIATION THERAPY | Facility: OUTPATIENT CENTER | Age: 73
End: 2023-09-15
Payer: MEDICARE

## 2023-09-15 VITALS
HEART RATE: 50 BPM | OXYGEN SATURATION: 99 % | SYSTOLIC BLOOD PRESSURE: 147 MMHG | RESPIRATION RATE: 12 BRPM | DIASTOLIC BLOOD PRESSURE: 80 MMHG

## 2023-09-15 DIAGNOSIS — C61 PROSTATE CANCER (H): Primary | ICD-10-CM

## 2023-09-15 NOTE — PROGRESS NOTES
Department of Radiation Oncology  Radiation Therapy Center  [facility] [place] [address]  [phone]       Radiation Oncology Follow-up Visit  9/15/23      Efraín Crouch  MRN: [id number]   : 1950     DISEASE TREATED: Prostate adenocarcinoma, unfavorable intermediate risk, Vladimir score 4+3=7, PSA = 15.1,  STAGE:  cT1cN0  RADIATION THERAPY DELIVERED: 7000 cGy x 29 fractions   RADIATION THERAPY SITE: Prostate  DATE OF RADIATION THERAPY: 1/10/22 to 22  INTERVAL SINCE COMPLETION OF RADIATION THERAPY: 1 year 7 mo    HISTORY OF PRESENT ILLNESS:   The patient is a 73 year old year old man who was found to have an elevated PSA with a prostate biopsy confirmation of adenocarcinoma of the prostate. He had been evaluated by Dr. Perales of Urology who referred the patient here for consideration of definitive radiotherapy. He had an initial screening PSA on 21 that was elevated to 15.16. Repeat PSA returned on 21 as 11.87. He underwent transrectal ultrasound-guided prostate biopsy on 21 that demonstrated Vladimir 4+3=7 adenocarcinoma of the prostate in 11/12 cores.      Dr. Perales in Urology initiated ST- ADT   #1 Eligard on 10/14/2021, 3 month  #2 Eligard on 2022, 3 month      Past Medical History:   Diagnosis Date    Elevated prostate specific antigen (PSA)     HTN (hypertension)     Hx of ischemic right MCA stroke     Hyperlipidemia     Left atrial enlargement     Nephrolithiasis     YANETH (obstructive sleep apnea)     Spinal stenosis, lumbar     Type 2 diabetes mellitus, with long-term current use of insulin (H)      SUBJECTIVE:   Gets up 3 to 4 times per night to urinate. On Flomax pre prostate cancer diagnosis. On Plavix and ASA for stroke. He is mostly sedentary. Walks his dogs.Has one dog, Luan Marin. Denies blood in urine or stool. Energy is adequate.     He had an episode of bloody stool in 2023. Saw Gen Surg Dr Cody at [facility], per Dr Cody note Efraín had  rectal bleeding intermittently for the past 4 weeks. It first occurred early last month, he was straining very hard while having a bowel movement, and since then intermittently had bleeding on stool. He had some blood in the toilet bowl, and also on toilet paper, and is mostly bright red. He had colonoscopy done in 6/2022 with normal results. He was in the ED for this and anoscopy was done at that time, reportedly some bleeding was seen at 12 o'clock position. He stopped straining per his wife's recommendation. He had no bleeding from then until one week ago when he got food poisoning after eating a hamburger at [employer]     He see's Hematology for normocytic anemia. Colonoscopy and EGD done on 6/27/22 normal colon and rectum, small hernia.      ROS: 14 point review of systems is negative other than what is stated in HPI    PHYSICAL EXAM:  BP (!) 147/80   Pulse 50   Resp 12   SpO2 99%    Gen: Alert, in NAD  Eyes: PERRL, EOMI, sclera anicteric  HENT     Head: NC/AT     Ears: No external auricular lesions     Nose/sinus: No rhinorrhea or epistaxis     Oral Cavity/Oropharynx: MMM  Neck: Supple, full ROM, no LAD  Pulm: No wheezing, stridor or respiratory distress  CV: Well-perfused, no cyanosis, no pedal edema  Abdominal: Soft, nontender, nondistended, no hepatomegaly  Back: No step-offs or pain to palpation along the thoracolumbar spine, no CVA tenderness  Musculoskeletal: Normal bulk and tone   Skin: Normal color and turgor  Neurologic: A/Ox3, CN II-XII intact  Psychiatric: Appropriate mood and affect    LABS AND IMAGING:  [facility] lab PSA on 3/15/2022 = 0.31  [facility] lab PSA on 6/29/22 = < 0.130  [facility] lab PSA 9/14/22 = 0.329  [facility] lab PSA 2/13/23 = 0.206  PSA 8/7/23 = 0.213    IMPRESSION:   Mr. Crouch is a 73 year old male with a prostate adenocarcinoma cT1c N0 who underwent definitive RT + ST ADT.   Seeing Urology Dr Perales.  Blood in stool in April seen by Gen Surg and examined. Thought to be hemorrhoids since  it resolved with change in diet. One episode since when he had food poisoning.   Doing well. No GI or  bother.  Trying to loose wt    PLAN:   RTO in 6 months with me after PSA.  Dr Perales orders PSA    Ilenaa PURI    10 minutes spent with the pt.

## 2023-09-15 NOTE — LETTER
9/15/2023         RE: Efraín Crouch  [address], [place] [address]        Dear Colleague,    Thank you for referring your patient, Efraín Crouch, to the [facility] RADIATION THERAPY CLINIC. Please see a copy of my visit note below.       Department of Radiation Oncology  Radiation Therapy Center  [facility]  [place] [address]  [phone]       Radiation Oncology Follow-up Visit  9/15/23      Efraín Crouch  MRN: [id number]   : 1950     DISEASE TREATED: Prostate adenocarcinoma, unfavorable intermediate risk, [place] score 4+3=7, PSA = 15.1,  STAGE:  cT1cN0  RADIATION THERAPY DELIVERED: 7000 cGy x 29 fractions   RADIATION THERAPY SITE: Prostate  DATE OF RADIATION THERAPY: 1/10/22 to 22  INTERVAL SINCE COMPLETION OF RADIATION THERAPY: 1 year 7 mo    HISTORY OF PRESENT ILLNESS:   The patient is a 73 year old year old man who was found to have an elevated PSA with a prostate biopsy confirmation of adenocarcinoma of the prostate. He had been evaluated by Dr. Perales of Urology who referred the patient here for consideration of definitive radiotherapy. He had an initial screening PSA on 21 that was elevated to 15.16. Repeat PSA returned on 21 as 11.87. He underwent transrectal ultrasound-guided prostate biopsy on 21 that demonstrated [place] 4+3=7 adenocarcinoma of the prostate in 11/12 cores.      Dr. Perales in Urology initiated ST- ADT   #1 Марина on 10/14/2021, 3 month  #2 Eligard on 2022, 3 month      Past Medical History:   Diagnosis Date    Elevated prostate specific antigen (PSA)     HTN (hypertension)     Hx of ischemic right MCA stroke     Hyperlipidemia     Left atrial enlargement     Nephrolithiasis     YANETH (obstructive sleep apnea)     Spinal stenosis, lumbar     Type 2 diabetes mellitus, with long-term current use of insulin (H)      SUBJECTIVE:   Gets up 3 to 4 times per night to urinate. On Flomax pre prostate cancer diagnosis. On Plavix  and ASA for stroke. He is mostly sedentary. Walks his dogs.Has one dog, Luan Marin. Denies blood in urine or stool. Energy is adequate.     He had an episode of bloody stool in April 2023. Saw Gen Surg Dr Cody at [facility], per Dr Cody note Efraín had rectal bleeding intermittently for the past 4 weeks. It first occurred early last month, he was straining very hard while having a bowel movement, and since then intermittently had bleeding on stool. He had some blood in the toilet bowl, and also on toilet paper, and is mostly bright red. He had colonoscopy done in 6/2022 with normal results. He was in the ED for this and anoscopy was done at that time, reportedly some bleeding was seen at 12 o'clock position. He stopped straining per his wife's recommendation. He had no bleeding from then until one week ago when he got food poisoning after eating a hamburger at [employer]     He see's Hematology for normocytic anemia. Colonoscopy and EGD done on 6/27/22 normal colon and rectum, small hernia.      ROS: 14 point review of systems is negative other than what is stated in HPI    PHYSICAL EXAM:  BP (!) 147/80   Pulse 50   Resp 12   SpO2 99%    Gen: Alert, in NAD  Eyes: PERRL, EOMI, sclera anicteric  HENT     Head: NC/AT     Ears: No external auricular lesions     Nose/sinus: No rhinorrhea or epistaxis     Oral Cavity/Oropharynx: MMM  Neck: Supple, full ROM, no LAD  Pulm: No wheezing, stridor or respiratory distress  CV: Well-perfused, no cyanosis, no pedal edema  Abdominal: Soft, nontender, nondistended, no hepatomegaly  Back: No step-offs or pain to palpation along the thoracolumbar spine, no CVA tenderness  Musculoskeletal: Normal bulk and tone   Skin: Normal color and turgor  Neurologic: A/Ox3, CN II-XII intact  Psychiatric: Appropriate mood and affect    LABS AND IMAGING:  [facility] lab PSA on 3/15/2022 = 0.31  [facility] lab PSA on 6/29/22 = < 0.130  [facility] lab PSA 9/14/22 = 0.329  [facility] lab PSA 2/13/23 = 0.206  PSA 8/7/23 =  0.213    IMPRESSION:   Mr. Crouch is a 73 year old male with a prostate adenocarcinoma cT1c N0 who underwent definitive RT + ST ADT.   Seeing Urology Dr Perales.  Blood in stool in April seen by Gen Surg and examined. Thought to be hemorrhoids since it resolved with change in diet. One episode since when he had food poisoning.   Doing well. No GI or  bother.  Trying to loose wt    PLAN:   RTO in 6 months with me after PSA.  Dr Perales orders PSA    Ileana PURI    10 minutes spent with the pt.       Again, thank you for allowing me to participate in the care of your patient.        Sincerely,        Ileana Cameron PA-C

## 2024-03-12 DIAGNOSIS — C61 PROSTATE CANCER (H): Primary | ICD-10-CM

## 2024-03-15 ENCOUNTER — OFFICE VISIT (OUTPATIENT)
Dept: RADIATION THERAPY | Facility: OUTPATIENT CENTER | Age: 74
End: 2024-03-15
Payer: MEDICARE

## 2024-03-15 DIAGNOSIS — C61 PROSTATE CANCER (H): Primary | ICD-10-CM

## 2024-03-15 NOTE — PROGRESS NOTES
Department of Radiation Oncology  Radiation Therapy Center  [facility] [place] [address]  [phone]       Radiation Oncology Follow-up Visit  3/15/24      Efraín Crouch  MRN: [id number]   : 1950     DISEASE TREATED: Prostate adenocarcinoma, unfavorable intermediate risk, Vladimir score 4+3=7, PSA = 15.1,  STAGE:  cT1cN0  RADIATION THERAPY DELIVERED: 7000 cGy x 29 fractions   RADIATION THERAPY SITE: Prostate  DATE OF RADIATION THERAPY: 1/10/22 to 22  INTERVAL SINCE COMPLETION OF RADIATION THERAPY: 2 years    HISTORY OF PRESENT ILLNESS:   The patient is a 73 year old year old man who was found to have an elevated PSA with a prostate biopsy confirmation of adenocarcinoma of the prostate. He had been evaluated by Dr. Perales of Urology who referred the patient here for consideration of definitive radiotherapy. He had an initial screening PSA on 21 that was elevated to 15.16. Repeat PSA returned on 21 as 11.87. He underwent transrectal ultrasound-guided prostate biopsy on 21 that demonstrated Vladimir 4+3=7 adenocarcinoma of the prostate in 11/12 cores.      Dr. Perales in Urology initiated ST- ADT   #1 Eligard on 10/14/2021, 3 month  #2 Eligard on 2022, 3 month      AUA   [facility]       Past Medical History:   Diagnosis Date    Elevated prostate specific antigen (PSA)     HTN (hypertension)     Hx of ischemic right MCA stroke     Hyperlipidemia     Left atrial enlargement     Nephrolithiasis     YANETH (obstructive sleep apnea)     Spinal stenosis, lumbar     Type 2 diabetes mellitus, with long-term current use of insulin (H)      SUBJECTIVE:   Gets up 3 to 4 times per night to urinate. On Flomax pre prostate cancer diagnosis. On Plavix and ASA for stroke. He is mostly sedentary. Walks his dogs.Has one dog, Luan Marin and Lc Tania. Denies blood in urine or stool. Energy is adequate.     He had an episode of bloody stool in 2023. Saw Gen Surg Dr Cody at  [facility], per Dr Escobar perry Efraín had rectal bleeding intermittently for the past 4 weeks. It first occurred early last month, he was straining very hard while having a bowel movement, and since then intermittently had bleeding on stool. He had some blood in the toilet bowl, and also on toilet paper, and is mostly bright red. He had colonoscopy done in 6/2022 with normal results. He was in the ED for this and anoscopy was done at that time, reportedly some bleeding was seen at 12 o'clock position. He stopped straining per his wife's recommendation.  He see's Hematology for normocytic anemia. Colonoscopy and EGD done on 6/27/22 normal colon and rectum, small hernia.      ROS: 14 point review of systems is negative other than what is stated in HPI    PHYSICAL EXAM:  There were no vitals taken for this visit.   Gen: Alert, in NAD  Eyes: PERRL, EOMI, sclera anicteric  HENT     Head: NC/AT     Ears: No external auricular lesions     Nose/sinus: No rhinorrhea or epistaxis     Oral Cavity/Oropharynx: MMM  Neck: Supple, full ROM, no LAD  Pulm: No wheezing, stridor or respiratory distress  CV: Well-perfused, no cyanosis, no pedal edema  Abdominal: Soft, nontender, nondistended, no hepatomegaly  Back: No step-offs or pain to palpation along the thoracolumbar spine, no CVA tenderness  Musculoskeletal: Normal bulk and tone   Skin: Normal color and turgor  Neurologic: A/Ox3, CN II-XII intact  Psychiatric: Appropriate mood and affect    LABS AND IMAGING:  PSA  3/13/24  0.155  8/7/23  0.213  2/13/23  0.206  9/14/22  0.329  6/29/22  < 0.130 (fawad)  3/15/2022   0.310  1/2022  Radiation Therapy  1/07/2022 3 month Eligard  10/14/2021 3 month Elidard  8/2/21  11.870      IMPRESSION:   Mr. Crouch is a 73 year old male with a prostate adenocarcinoma cT1c N0 who underwent definitive RT + ST ADT.   Seeing Urology Dr Perales.  Blood in stool in April 2023seen by Gen Surg and examined. Thought to be hemorrhoids since it resolved with change in  diet. One episode since when he had food poisoning.   Doing well. No GI or  bother.  Trying to loose wt  PSA is stable and low.     PLAN:   RTO in 6 months with me after PSA.      Ileana PURI    10 minutes spent with the pt.

## 2024-03-15 NOTE — LETTER
3/15/2024         RE: Efraín Crouch  [address] [facility] [address]        Dear Colleague,    Thank you for referring your patient, Efraín Crouch, to the [facility] RADIATION THERAPY CLINIC. Please see a copy of my visit note below.       Department of Radiation Oncology  Radiation Therapy Center  [facility]  [facility] [place] [address]  [phone]       Radiation Oncology Follow-up Visit  3/15/24      Efraín Crouch  MRN: [id number]   : 1950     DISEASE TREATED: Prostate adenocarcinoma, unfavorable intermediate risk, [place] score 4+3=7, PSA = 15.1,  STAGE:  cT1cN0  RADIATION THERAPY DELIVERED: 7000 cGy x 29 fractions   RADIATION THERAPY SITE: Prostate  DATE OF RADIATION THERAPY: 1/10/22 to 22  INTERVAL SINCE COMPLETION OF RADIATION THERAPY: 2 years    HISTORY OF PRESENT ILLNESS:   The patient is a 73 year old year old man who was found to have an elevated PSA with a prostate biopsy confirmation of adenocarcinoma of the prostate. He had been evaluated by Dr. Perales of Urology who referred the patient here for consideration of definitive radiotherapy. He had an initial screening PSA on 21 that was elevated to 15.16. Repeat PSA returned on 21 as 11.87. He underwent transrectal ultrasound-guided prostate biopsy on 21 that demonstrated Vladimir 4+3=7 adenocarcinoma of the prostate in 11/12 cores.      Dr. Perales in Urology initiated ST- ADT   #1 Марина on 10/14/2021, 3 month  #2 Eligard on 2022, 3 month      AUA   [facility]       Past Medical History:   Diagnosis Date    Elevated prostate specific antigen (PSA)     HTN (hypertension)     Hx of ischemic right MCA stroke     Hyperlipidemia     Left atrial enlargement     Nephrolithiasis     YANETH (obstructive sleep apnea)     Spinal stenosis, lumbar     Type 2 diabetes mellitus, with long-term current use of insulin (H)      SUBJECTIVE:   Gets up 3 to 4 times per night to urinate. On Flomax pre prostate cancer  diagnosis. On Plavix and ASA for stroke. He is mostly sedentary. Walks his dogs.Has one dog, Luan Marin and Lc Marin. Denies blood in urine or stool. Energy is adequate.     He had an episode of bloody stool in April 2023. Saw Gen Surg Dr Cody at [facility], per Dr Cody note Efraín had rectal bleeding intermittently for the past 4 weeks. It first occurred early last month, he was straining very hard while having a bowel movement, and since then intermittently had bleeding on stool. He had some blood in the toilet bowl, and also on toilet paper, and is mostly bright red. He had colonoscopy done in 6/2022 with normal results. He was in the ED for this and anoscopy was done at that time, reportedly some bleeding was seen at 12 o'clock position. He stopped straining per his wife's recommendation.  He see's Hematology for normocytic anemia. Colonoscopy and EGD done on 6/27/22 normal colon and rectum, small hernia.      ROS: 14 point review of systems is negative other than what is stated in HPI    PHYSICAL EXAM:  There were no vitals taken for this visit.   Gen: Alert, in NAD  Eyes: PERRL, EOMI, sclera anicteric  HENT     Head: NC/AT     Ears: No external auricular lesions     Nose/sinus: No rhinorrhea or epistaxis     Oral Cavity/Oropharynx: MMM  Neck: Supple, full ROM, no LAD  Pulm: No wheezing, stridor or respiratory distress  CV: Well-perfused, no cyanosis, no pedal edema  Abdominal: Soft, nontender, nondistended, no hepatomegaly  Back: No step-offs or pain to palpation along the thoracolumbar spine, no CVA tenderness  Musculoskeletal: Normal bulk and tone   Skin: Normal color and turgor  Neurologic: A/Ox3, CN II-XII intact  Psychiatric: Appropriate mood and affect    LABS AND IMAGING:  PSA  3/13/24  0.155  8/7/23  0.213  2/13/23  0.206  9/14/22  0.329  6/29/22  < 0.130 (fawad)  3/15/2022   0.310  1/2022  Radiation Therapy  1/07/2022 3 month Eligard  10/14/2021 3 month Elidard  8/2/21  11.870      IMPRESSION:   Mr. Crouch  is a 73 year old male with a prostate adenocarcinoma cT1c N0 who underwent definitive RT + ST ADT.   Seeing Urology Dr Perales.  Blood in stool in April 2023seen by Gen Surg and examined. Thought to be hemorrhoids since it resolved with change in diet. One episode since when he had food poisoning.   Doing well. No GI or  bother.  Trying to loose wt  PSA is stable and low.     PLAN:   RTO in 6 months with me after PSA.      Ileana PURI    10 minutes spent with the pt.

## 2024-10-02 ENCOUNTER — VIRTUAL VISIT (OUTPATIENT)
Dept: RADIATION THERAPY | Facility: OUTPATIENT CENTER | Age: 74
End: 2024-10-02
Payer: COMMERCIAL

## 2024-10-02 DIAGNOSIS — C61 PROSTATE CANCER (H): Primary | ICD-10-CM

## 2024-10-02 RX ORDER — TAMSULOSIN HYDROCHLORIDE 0.4 MG/1
0.4 CAPSULE ORAL DAILY
Qty: 90 CAPSULE | Refills: 3 | Status: SHIPPED | OUTPATIENT
Start: 2024-10-02

## 2024-10-02 NOTE — PROGRESS NOTES
Department of Radiation Oncology  Radiation Therapy Center  [facility]  [place] [address]  [phone]       Radiation Oncology Follow-up Visit  10/2/24      Efraín Crouch  MRN: [id number]   : 1950     Radiation Oncologist: Aren Madden MD  Provider: JAXSON Rodriguez  LCV: 3/15/24    Telephone visit. [place] in [place], provider remote    DISEASE TREATED: Prostate adenocarcinoma, unfavorable intermediate risk, Vladimir score 4+3=7, PSA = 15.1,  STAGE:  cT1cN0  RADIATION THERAPY DELIVERED: 7000 cGy x 29 fractions   RADIATION THERAPY SITE: Prostate  DATE OF RADIATION THERAPY: 1/10/22 to 22  INTERVAL SINCE COMPLETION OF RADIATION THERAPY: 2 years    HISTORY OF PRESENT ILLNESS:   The patient is a 74 year old year old man who was found to have an elevated PSA with a prostate biopsy confirmation of adenocarcinoma of the prostate. He had been evaluated by Dr. Perales of Urology who referred the patient here for consideration of definitive radiotherapy. He had an initial screening PSA on 21 that was elevated to 15.16. Repeat PSA returned on 21 as 11.87. He underwent transrectal ultrasound-guided prostate biopsy on 21 that demonstrated Vladimir 4+3=7 adenocarcinoma of the prostate in 11/12 cores.      Dr. Perales in Urology initiated ST- ADT   #1 Eligard on 10/14/2021, 3 month  #2 Eligard on 2022, 3 month    AUA   [facility]       Past Medical History:   Diagnosis Date    Elevated prostate specific antigen (PSA)     HTN (hypertension)     Hx of ischemic right MCA stroke     Hyperlipidemia     Left atrial enlargement     Nephrolithiasis     YANETH (obstructive sleep apnea)     Spinal stenosis, lumbar     Type 2 diabetes mellitus, with long-term current use of insulin (H)      SUBJECTIVE:   Gets up 3 to 4 times per night to urinate. On Flomax pre prostate cancer diagnosis. On Plavix and ASA for stroke. He is mostly sedentary. Walks his dogs.Has one dog, Luan Marin and Lc Marin.  Denies blood in urine or stool. Energy is adequate.     He had an episode of bloody stool in April 2023. Saw Gen Surg Dr Cody at [facility], per Dr Cody note Efraín had rectal bleeding intermittently for the past 4 weeks. It first occurred early last month, he was straining very hard while having a bowel movement, and since then intermittently had bleeding on stool. He had some blood in the toilet bowl, and also on toilet paper, and is mostly bright red. He had colonoscopy done in 6/2022 with normal results. He was in the ED for this and anoscopy was done at that time, reportedly some bleeding was seen at 12 o'clock position. He stopped straining per his wife's recommendation.  He see's Hematology for normocytic anemia. Colonoscopy and EGD done on 6/27/22 normal colon and rectum, small hernia.      ROS: 14 point review of systems is negative other than what is stated in HPI    PHYSICAL EXAM:  There were no vitals taken for this visit.   Gen: Alert, in NAD      LABS AND IMAGING:  PSA  8/23/24  0.170  3/13/24  0.155  8/7/23  0.213  2/13/23  0.206  9/14/22  0.329  6/29/22  < 0.130 (fawad)  3/15/2022   0.310  1/2022  Radiation Therapy  1/07/2022 3 month Eligard  10/14/2021 3 month Elidard  8/2/21  11.870      IMPRESSION:   Mr. Crouch is a 74year old male with a prostate adenocarcinoma cT1c N0 who underwent definitive RT + ST ADT.   Was seeing Urology Dr Perales. He retired and will now see Dr Lopes. Appt scheduled 8/2025.  Blood in stool in April 2023 seen by Gen Surg and examined. Thought to be hemorrhoids since it resolved with change in diet. One episode since when he had food poisoning.   Doing well. No GI or  bother.  Trying to loose wt  PSA is stable and low.     PLAN:   PSA in February 2025 and phone call or in person with me thereafter.   Seeing Dr Lopes in August 2025 (annual visit to review PSA and DIOMEDES per Dr Perales's note)  Refilled Flomax    Ileana Cameron PA    10 minutes spent on the phone with the pt 3:00  to 3: 10 pm

## 2024-10-02 NOTE — Clinical Note
PSA in Feb followed by visit with me. Can be phone or in person whichever he prefers.  He goes to [facility]. Please fax over the order and mail a copy to him Thank you

## 2024-10-02 NOTE — LETTER
10/2/2024      Efraín Crouch  [address] [facility] [address]      Dear Colleague,    Thank you for referring your patient, Efraín Crouch, to the [facility] RADIATION THERAPY CLINIC. Please see a copy of my visit note below.       Department of Radiation Oncology  Radiation Therapy Center  [facility]  [place] [address]  [phone]       Radiation Oncology Follow-up Visit  10/2/24      Efraín Crouch  MRN: [id number]   : 1950     Radiation Oncologist: Aren Madden MD  Provider: JAXSON Rodriguez  LCV: 3/15/24    Telephone visit. [place] in [place], provider remote    DISEASE TREATED: Prostate adenocarcinoma, unfavorable intermediate risk, [place] score 4+3=7, PSA = 15.1,  STAGE:  cT1cN0  RADIATION THERAPY DELIVERED: 7000 cGy x 29 fractions   RADIATION THERAPY SITE: Prostate  DATE OF RADIATION THERAPY: 1/10/22 to 22  INTERVAL SINCE COMPLETION OF RADIATION THERAPY: 2 years    HISTORY OF PRESENT ILLNESS:   The patient is a 74 year old year old man who was found to have an elevated PSA with a prostate biopsy confirmation of adenocarcinoma of the prostate. He had been evaluated by Dr. Perales of Urology who referred the patient here for consideration of definitive radiotherapy. He had an initial screening PSA on 21 that was elevated to 15.16. Repeat PSA returned on 21 as 11.87. He underwent transrectal ultrasound-guided prostate biopsy on 21 that demonstrated [place] 4+3=7 adenocarcinoma of the prostate in 11/12 cores.      Dr. Perales in Urology initiated ST- ADT   #1 Eligard on 10/14/2021, 3 month  #2 Eligard on 2022, 3 month    AUA   DINO       Past Medical History:   Diagnosis Date     Elevated prostate specific antigen (PSA)      HTN (hypertension)      Hx of ischemic right MCA stroke      Hyperlipidemia      Left atrial enlargement      Nephrolithiasis      YANETH (obstructive sleep apnea)      Spinal stenosis, lumbar      Type 2 diabetes mellitus, with  long-term current use of insulin (H)      SUBJECTIVE:   Gets up 3 to 4 times per night to urinate. On Flomax pre prostate cancer diagnosis. On Plavix and ASA for stroke. He is mostly sedentary. Walks his dogs.Has one dog, Luan Marin and Lc Marin. Denies blood in urine or stool. Energy is adequate.     He had an episode of bloody stool in April 2023. Saw Gen Surg Dr Cody at [facility], per Dr Cody note Efraín had rectal bleeding intermittently for the past 4 weeks. It first occurred early last month, he was straining very hard while having a bowel movement, and since then intermittently had bleeding on stool. He had some blood in the toilet bowl, and also on toilet paper, and is mostly bright red. He had colonoscopy done in 6/2022 with normal results. He was in the ED for this and anoscopy was done at that time, reportedly some bleeding was seen at 12 o'clock position. He stopped straining per his wife's recommendation.  He see's Hematology for normocytic anemia. Colonoscopy and EGD done on 6/27/22 normal colon and rectum, small hernia.      ROS: 14 point review of systems is negative other than what is stated in HPI    PHYSICAL EXAM:  There were no vitals taken for this visit.   Gen: Alert, in NAD      LABS AND IMAGING:  PSA  8/23/24  0.170  3/13/24  0.155  8/7/23  0.213  2/13/23  0.206  9/14/22  0.329  6/29/22  < 0.130 (fawad)  3/15/2022   0.310  1/2022  Radiation Therapy  1/07/2022 3 month Eligard  10/14/2021 3 month Elidard  8/2/21  11.870      IMPRESSION:   Mr. Crouch is a 74year old male with a prostate adenocarcinoma cT1c N0 who underwent definitive RT + ST ADT.   Was seeing Urology Dr Perales. He retired and will now see Dr Lopes. Appt scheduled 8/2025.  Blood in stool in April 2023 seen by Gen Surg and examined. Thought to be hemorrhoids since it resolved with change in diet. One episode since when he had food poisoning.   Doing well. No GI or  bother.  Trying to loose wt  PSA is stable and low.     PLAN:   PSA  in February 2025 and phone call or in person with me thereafter.   Seeing Dr Lopes in August 2025 (annual visit to review PSA and DIOMEDES per Dr Perales's note)  Refilled Flomax    Ileana PURI    10 minutes spent on the phone with the pt 3:00 to 3: 10 pm      Again, thank you for allowing me to participate in the care of your patient.        Sincerely,        Ileana Cameron PA-C

## 2025-02-05 ENCOUNTER — VIRTUAL VISIT (OUTPATIENT)
Dept: RADIATION THERAPY | Facility: OUTPATIENT CENTER | Age: 75
End: 2025-02-05
Payer: COMMERCIAL

## 2025-02-05 DIAGNOSIS — C61 PROSTATE CANCER (H): Primary | ICD-10-CM

## 2025-02-05 NOTE — PROGRESS NOTES
Department of Radiation Oncology  Radiation Therapy Center  [facility]  [place] [address]  [phone]       Radiation Oncology Follow-up Visit  25      Efraín Crouch  MRN: [id number]   : 1950     Radiation Oncologist: Aren Madden MD  Provider: JAXSON Rodriguez  LCV: 10/2/24    Telephone visit. [place] in [place], provider remote    DISEASE TREATED: Prostate adenocarcinoma, unfavorable intermediate risk, [place] score 4+3=7, PSA = 15.1,  STAGE:  cT1cN0  RADIATION THERAPY DELIVERED: 7000 cGy x 29 fractions   RADIATION THERAPY SITE: Prostate  DATE OF RADIATION THERAPY: 1/10/22 to 22  INTERVAL SINCE COMPLETION OF RADIATION THERAPY: 3 years    HISTORY OF PRESENT ILLNESS:   The patient is a 74 year old year old man who was found to have an elevated PSA with a prostate biopsy confirmation of adenocarcinoma of the prostate. He had been evaluated by Dr. Perales of Urology who referred the patient here for consideration of definitive radiotherapy. He had an initial screening PSA on 21 that was elevated to 15.16. Repeat PSA returned on 21 as 11.87. He underwent transrectal ultrasound-guided prostate biopsy on 21 that demonstrated [place] 4+3=7 adenocarcinoma of the prostate in 11/12 cores.      Dr. Perales in Urology initiated ST- ADT   #1 Eligard on 10/14/2021, 3 month  #2 Eligard on 2022, 3 month    AUA   DINO       Past Medical History:   Diagnosis Date    Elevated prostate specific antigen (PSA)     HTN (hypertension)     Hx of ischemic right MCA stroke     Hyperlipidemia     Left atrial enlargement     Nephrolithiasis     YANETH (obstructive sleep apnea)     Spinal stenosis, lumbar     Type 2 diabetes mellitus, with long-term current use of insulin (H)      SUBJECTIVE:   Gets up 1 to 2 times per night to urinate. Improved from 3 to 4 just 4 mo ago. On Flomax pre prostate cancer diagnosis. On Plavix and ASA for stroke. He is mostly sedentary. Walks his dogs,  Luan Tania and Lc Tania. Denies blood in urine or stool. Energy is adequate.     He had an episode of bloody stool in April 2023. Saw Gen Surg Dr Cody at [facility], per Dr Cody note Efraín had rectal bleeding intermittently for the past 4 weeks. It first occurred early last month, he was straining very hard while having a bowel movement, and since then intermittently had bleeding on stool. He had some blood in the toilet bowl, and also on toilet paper, and is mostly bright red. He had colonoscopy done in 6/2022 with normal results. He was in the ED for this and anoscopy was done at that time, reportedly some bleeding was seen at 12 o'clock position. He stopped straining per his wife's recommendation.  He see's Hematology for anemia which they attribute to renal insufficiency. Colonoscopy and EGD done on 6/27/22 normal colon and rectum, small hernia.      ROS: 14 point review of systems is negative other than what is stated in HPI    PHYSICAL EXAM:  There were no vitals taken for this visit.   Gen: Alert, in NAD      LABS AND IMAGING:  PSA  1/15/24  0.18  8/23/24  0.170  3/13/24  0.155  8/7/23  0.213  2/13/23  0.206  9/14/22  0.329  6/29/22  < 0.130 (fawad)  3/15/2022   0.310  1/2022  Radiation Therapy  1/07/2022 3 month Eligard  10/14/2021 3 month Elidard  8/2/21  11.870      IMPRESSION:   Mr. Crouch is a 74year old male with a prostate adenocarcinoma cT1c N0 who underwent definitive RT + ST ADT.   Was seeing Urology Dr Perales. He retired and will now see Dr Lopes. Appt scheduled 8/2025.  Blood in stool in April 2023 seen by Gen Surg and examined. Thought to be hemorrhoids since it resolved with change in diet. One episode since when he had food poisoning.   Doing well. No GI or  bother.  Trying to loose wt  PSA is stable and low.     PLAN:   PSA in 6 mo and phone call or in person with me thereafter.   Seeing Dr Lopes in August 2025 (annual visit to review PSA and DIOMEDES per Dr Perales's note)    Ileana PURI    10  minutes spent on the phone with the pt 10:35 to 10:45 am

## 2025-02-05 NOTE — LETTER
2025      Efraín Crouch  [address] [facility] [address]      Dear Colleague,    Thank you for referring your patient, Efrían Crouch, to the [facility] RADIATION THERAPY CLINIC. Please see a copy of my visit note below.       Department of Radiation Oncology  Radiation Therapy Center  [facility]  [place] [address]  [phone]       Radiation Oncology Follow-up Visit  25      Efraín Crouch  MRN: [id number]   : 1950     Radiation Oncologist: Aren Madden MD  Provider: JAXSON Rodriguez  LCV: 10/2/24    [place] visit. [place] in [place], provider remote    DISEASE TREATED: Prostate adenocarcinoma, unfavorable intermediate risk, [place] score 4+3=7, PSA = 15.1,  STAGE:  cT1cN0  RADIATION THERAPY DELIVERED: 7000 cGy x 29 fractions   RADIATION THERAPY SITE: Prostate  DATE OF RADIATION THERAPY: 1/10/22 to 22  INTERVAL SINCE COMPLETION OF RADIATION THERAPY: 3 years    HISTORY OF PRESENT ILLNESS:   The patient is a 74 year old year old man who was found to have an elevated PSA with a prostate biopsy confirmation of adenocarcinoma of the prostate. He had been evaluated by Dr. Perales of Urology who referred the patient here for consideration of definitive radiotherapy. He had an initial screening PSA on 21 that was elevated to 15.16. Repeat PSA returned on 21 as 11.87. He underwent transrectal ultrasound-guided prostate biopsy on 21 that demonstrated [place] 4+3=7 adenocarcinoma of the prostate in 11/12 cores.      Dr. Perales in Urology initiated ST- ADT   #1 Eligard on 10/14/2021, 3 month  #2 Eligard on 2022, 3 month    AUA   DINO       Past Medical History:   Diagnosis Date     Elevated prostate specific antigen (PSA)      HTN (hypertension)      Hx of ischemic right MCA stroke      Hyperlipidemia      Left atrial enlargement      Nephrolithiasis      YANETH (obstructive sleep apnea)      Spinal stenosis, lumbar      Type 2 diabetes mellitus, with  long-term current use of insulin (H)      SUBJECTIVE:   Gets up 1 to 2 times per night to urinate. Improved from 3 to 4 just 4 mo ago. On Flomax pre prostate cancer diagnosis. On Plavix and ASA for stroke. He is mostly sedentary. Walks his dogs, Luan Marin and Lc Tania. Denies blood in urine or stool. Energy is adequate.     He had an episode of bloody stool in April 2023. Saw Gen Surg Dr Cody at [facility], per Dr Cody note Efraín had rectal bleeding intermittently for the past 4 weeks. It first occurred early last month, he was straining very hard while having a bowel movement, and since then intermittently had bleeding on stool. He had some blood in the toilet bowl, and also on toilet paper, and is mostly bright red. He had colonoscopy done in 6/2022 with normal results. He was in the ED for this and anoscopy was done at that time, reportedly some bleeding was seen at 12 o'clock position. He stopped straining per his wife's recommendation.  He see's Hematology for anemia which they attribute to renal insufficiency. Colonoscopy and EGD done on 6/27/22 normal colon and rectum, small hernia.      ROS: 14 point review of systems is negative other than what is stated in HPI    PHYSICAL EXAM:  There were no vitals taken for this visit.   Gen: Alert, in NAD      LABS AND IMAGING:  PSA  1/15/24  0.18  8/23/24  0.170  3/13/24  0.155  8/7/23  0.213  2/13/23  0.206  9/14/22  0.329  6/29/22  < 0.130 (fawad)  3/15/2022   0.310  1/2022  Radiation Therapy  1/07/2022 3 month Eligard  10/14/2021 3 month Elidard  8/2/21  11.870      IMPRESSION:   Mr. Crouch is a 74year old male with a prostate adenocarcinoma cT1c N0 who underwent definitive RT + ST ADT.   Was seeing Urology Dr Perales. He retired and will now see Dr Lopes. Appt scheduled 8/2025.  Blood in stool in April 2023 seen by Gen Surg and examined. Thought to be hemorrhoids since it resolved with change in diet. One episode since when he had food poisoning.   Doing well. No GI or   bother.  Trying to loose wt  PSA is stable and low.     PLAN:   PSA in 6 mo and phone call or in person with me thereafter.   Seeing Dr Lopes in August 2025 (annual visit to review PSA and DIOMEDES per Dr Perales's note)    Ileana PURI    10 minutes spent on the phone with the pt 10:35 to 10:45 am      Again, thank you for allowing me to participate in the care of your patient.        Sincerely,        Ileana Cameron PA-C    Electronically signed

## 2025-08-12 ENCOUNTER — VIRTUAL VISIT (OUTPATIENT)
Dept: RADIATION THERAPY | Facility: OUTPATIENT CENTER | Age: 75
End: 2025-08-12
Payer: COMMERCIAL

## 2025-08-12 DIAGNOSIS — C61 PROSTATE CANCER (H): Primary | ICD-10-CM

## 2025-08-12 DIAGNOSIS — N52.8 OTHER MALE ERECTILE DYSFUNCTION: ICD-10-CM

## 2025-08-12 RX ORDER — SILDENAFIL 100 MG/1
100 TABLET, FILM COATED ORAL DAILY PRN
Qty: 15 TABLET | Refills: 1 | Status: SHIPPED | OUTPATIENT
Start: 2025-08-12

## (undated) DEVICE — SUCTION TIP POOLE K770

## (undated) DEVICE — Device

## (undated) DEVICE — SYSTEM HYDROGEL SPACEOAR INJEC 10ML SO-2101

## (undated) DEVICE — LUBRICATING JELLY 4.25OZ

## (undated) DEVICE — LABEL MEDICATION SYSTEM  3304

## (undated) DEVICE — BASIN SET MINOR DISP

## (undated) DEVICE — SYR 10ML FINGER CONTROL W/O NDL 309695

## (undated) DEVICE — GLOVE PROTEXIS W/NEU-THERA 7.0  2D73TE70

## (undated) DEVICE — GOWN XLG DISP 9545

## (undated) DEVICE — PREP POVIDONE IODINE SCRUB 7.5% 120ML

## (undated) DEVICE — PREP POVIDONE IODINE SOLUTION 10% 120ML

## (undated) DEVICE — DRAPE IOBAN INCISE 13X13" 6640EZ

## (undated) DEVICE — NDL ECLIPSE 25GA 1.5"

## (undated) DEVICE — PEN MARKING SKIN SKRIBE BLUE

## (undated) DEVICE — DRAPE TOWEL 17X27" BLUE LF DISP 28700-004

## (undated) DEVICE — PACK SET-UP STD 9102

## (undated) DEVICE — TUBING SUCTION 12"X1/4" N612

## (undated) DEVICE — PREP SKIN SCRUB TRAY 4461A

## (undated) DEVICE — DRAPE GYN/UROLOGY FLUID POUCH TUR 29455

## (undated) DEVICE — DRSG GAUZE 4X4" TRAY

## (undated) DEVICE — NDL ECLIPSE 22GA 1.5"

## (undated) RX ORDER — EPHEDRINE SULFATE 50 MG/ML
INJECTION, SOLUTION INTRAMUSCULAR; INTRAVENOUS; SUBCUTANEOUS
Status: DISPENSED
Start: 2021-12-22

## (undated) RX ORDER — FENTANYL CITRATE 50 UG/ML
INJECTION, SOLUTION INTRAMUSCULAR; INTRAVENOUS
Status: DISPENSED
Start: 2021-12-22

## (undated) RX ORDER — LIDOCAINE HYDROCHLORIDE 20 MG/ML
INJECTION, SOLUTION EPIDURAL; INFILTRATION; INTRACAUDAL; PERINEURAL
Status: DISPENSED
Start: 2021-12-22

## (undated) RX ORDER — BUPIVACAINE HYDROCHLORIDE 5 MG/ML
INJECTION, SOLUTION EPIDURAL; INTRACAUDAL
Status: DISPENSED
Start: 2021-12-22